# Patient Record
Sex: FEMALE | Race: WHITE | NOT HISPANIC OR LATINO
[De-identification: names, ages, dates, MRNs, and addresses within clinical notes are randomized per-mention and may not be internally consistent; named-entity substitution may affect disease eponyms.]

---
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
## 2023-01-23 ENCOUNTER — APPOINTMENT (OUTPATIENT)
Age: 18
End: 2023-01-23
Payer: MEDICAID

## 2023-01-23 VITALS
WEIGHT: 215 LBS | HEIGHT: 65 IN | SYSTOLIC BLOOD PRESSURE: 103 MMHG | DIASTOLIC BLOOD PRESSURE: 70 MMHG | BODY MASS INDEX: 35.82 KG/M2 | OXYGEN SATURATION: 97 % | TEMPERATURE: 97.7 F | HEART RATE: 103 BPM

## 2023-01-23 PROCEDURE — 99214 OFFICE O/P EST MOD 30 MIN: CPT

## 2023-01-23 RX ORDER — SACUBITRIL AND VALSARTAN 24; 26 MG/1; MG/1
24-26 TABLET, FILM COATED ORAL
Qty: 60 | Refills: 0 | Status: DISCONTINUED | COMMUNITY
Start: 2023-01-16

## 2023-01-25 NOTE — ASSESSMENT
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 On CT chest from March 2022 the heart was already enlarged. She seems to have no arrhythmia or conduction disease. \par \par Has NYHA class II-III symptoms (unchanged from prior) In December still had severely elevated pBNP. She appears euvolemic on exam. Cr has been stable.\par \par Repeat echo in December shows worsened diastology now with restrictive pattern. LVEF is grossly unchanged and RV function is preserved.\par \par - Continue Entresto to 49-51 mg bid \par - Lower Toprol XL to 25 mg once daily because of dizziness\par - Refer for CPET at [facility]\par - Continue close follow-up with rheumatology as immunosuppression is the cornerstone of treatment  \par \par Jay Castro MD. \par

## 2023-01-25 NOTE — PHYSICAL EXAM
[Normal] : no edema, no cyanosis, no clubbing, no varicosities [No Edema] : no edema [de-identified] : Warm

## 2023-01-25 NOTE — HISTORY OF PRESENT ILLNESS
[FreeTextEntry1] : Date of HF diagnosis: \par Etiology of CMP: EGPA (Churg-Virgie)\par Date of last hospitalization: None here in [place] \par Date of last echocardiogram: 9/6/2022\par LVEF/LVEDD: 27%/6.6cm\par Type of ischemic work-up: CCTA\par Prior RHC: No \par \par Diabetes: No \par Afib: No \par CKD: No \par ICD/CRT:\par \par ACEi/ARB: No \par ARNI: Yes \par MRA: No \par BB: Yes \par SGLT2i: No \par Nitrates/Hydralazine: No \par \par CardioMEMs: No \par In clinical trial: No \par \par 16 yo woman with a history of EGPA diagnosed 2021 in [place]. Also saw some doctors in [place] about it. Now followed by rheumatology here. She had a +ANCA reportedly in [place] but in later assessments it has been negative. \par \par Treated with steroids in [place] and [place], improved her joint pains but had severe side effects like weight gain and mood changed. Currently was being treated with MTX (rasuvo) and more recently, after discovery of CMP, she was started on Nucala (Mepolizumab). Currently is being treated with Nucala every 4 weeks. \par \par CCTA was performed since last visit to rule out coronary vasculitis. This test showed no evidence of coronary vasculitis. \par \par In all prior visits and today she denies joint pains, muscle pains or recurrent rashes. She complains mostly of exertional dyspnea, no orthopnea, PND or LE edema. Has no palpitations, presyncope or syncope. Was started on Toprol on last visit and is tolerating well.\par \par She is currently on moderate dose ARNI and BB. She continues having intermittent episodes of lightheadedness. No syncope or presyncope. No LE edema, no orthopnea or PND. Is happy because restricting carbohydrates helped her lose 11kg. \par \par ***EGPA hx summary from Rheumatology note:\par She is recent immigrant from [place] \par at age 14-15 she developed recurrent sinusitis and pulm symptoms with SOB, that times she was diagnosed with Asthma and started treated with nebs, symbicort, recurrent systemic steroid need, pt remained symptomatic and asthma was poorly controlled despite treatment. \par On Nov 23rd 2019 developed arthritis and rash affecting arms and lower extremities, diagnoses was unclear for months. Subsequently she was evaluated in [place] and told asthma. \par 02/22/21 CT chest with mediastinal LAD, GGO and parenchymal density, small pericardial effusion as per report. \par She subsequently found peripheral eosinophilia up to 70% as per pt Mom. \par Had bone marrow biopsy and no lymphoproliferative disease found. At that time suspected EGPA and around May 2021 started treatment with high doses of Medrol and MTX titrated up to 20mg. \par Steroid dose gradually tapered down. She is off treatment since November, 2021 and was waiting for insurance and run out meds. \par Had COVID March 2021.

## 2023-01-25 NOTE — CARDIOLOGY SUMMARY
[de-identified] : Date of HF diagnosis: \par Etiology of CMP: EGPA (Churg-Virgie)\par Date of last hospitalization: None here in [place] \par Date of last echocardiogram: 9/6/2022\par LVEF/LVEDD: 27%/6.6cm\par Type of ischemic work-up: None (CMRI)\par Prior RHC: No \par \par Diabetes: No \par Afib: No \par CKD: No \par ICD/CRT:\par \par ACEi/ARB: No \par ARNI: No \par MRA: No \par BB: Yes \par SGLT2i: No \par Nitrates/Hydralazine: No \par \par CardioMEMs: No \par In clinical trial: No \par \par 18 yo woman with a history of EGPA diagnosed 2021 in [place]. Also saw some doctors in [place] about it. Now followed by rheumatology here. She had a +ANCA reportedly in [place] but in later assessments it has been negative. \par \par Treated with steroids in [place] and [place], improved her joint pains but had severe side effects like weight gain and mood changed. Currently was being treated with MTX (rasuvo) and more recently, after discovery of CMP, she was started on Nucala (Mepolizumab), only received one dose so far.\par \par She states that the treatment she is on for EGPA is helping. She denies joint pains, muscle pains or recurrent rashes. She complains mostly of exertional dyspnea, no orthopnea, PND or LE edema. Has no palpiations, presyncope or syncope. Was started on Toprol on last visit and is tolerating well.\par \par ***EGPA hx summary from Rheumatology note:\par She is recent immigrant from [place] \par at age 14-15 she developed recurrent sinusitis and pulm symptoms with SOB, that times she was diagnosed with Asthma and started treated with nebs, symbicort, recurrent systemic steroid need, pt remained symptomatic and asthma was poorly controlled despite treatment. \par On Nov 23rd 2019 developed arthritis and rash affecting arms and lower extremities, diagnoses was unclear for months. Subsequently she was evaluated in [place] and told asthma. \par 02/22/21 CT chest with mediastinal LAD, GGO and parenchymal density, small pericardial effusion as per report. \par She subsequently found peripheral eosinophilia up to 70% as per pt Mom. \par Had bone marrow biopsy and no lymphoproliferative disease found. At that time suspected EGPA and around May 2021 started treatment with high doses of Medrol and MTX titrated up to 20mg. \par Steroid dose gradually tapered down. She is off treatment since November, 2021 and was waiting for insurance and run out meds. \par Had COVID March 2021.

## 2023-02-10 PROBLEM — Z01.818 PRE-OP TESTING: Status: RESOLVED | Noted: 2023-01-30 | Resolved: 2023-02-10

## 2023-02-14 ENCOUNTER — APPOINTMENT (OUTPATIENT)
Dept: HEART AND VASCULAR | Facility: CLINIC | Age: 18
End: 2023-02-14
Payer: MEDICAID

## 2023-02-15 ENCOUNTER — APPOINTMENT (OUTPATIENT)
Dept: PULMONOLOGY | Facility: CLINIC | Age: 18
End: 2023-02-15

## 2023-02-15 LAB — SARS-COV-2 N GENE NPH QL NAA+PROBE: NOT DETECTED

## 2023-02-24 ENCOUNTER — APPOINTMENT (OUTPATIENT)
Dept: HEART FAILURE | Facility: CLINIC | Age: 18
End: 2023-02-24

## 2023-02-28 ENCOUNTER — APPOINTMENT (OUTPATIENT)
Dept: RHEUMATOLOGY | Facility: CLINIC | Age: 18
End: 2023-02-28

## 2023-03-28 ENCOUNTER — APPOINTMENT (OUTPATIENT)
Dept: PULMONOLOGY | Facility: CLINIC | Age: 18
End: 2023-03-28
Payer: MEDICAID

## 2023-03-28 VITALS
HEIGHT: 65 IN | SYSTOLIC BLOOD PRESSURE: 96 MMHG | OXYGEN SATURATION: 97 % | DIASTOLIC BLOOD PRESSURE: 65 MMHG | TEMPERATURE: 97.7 F | WEIGHT: 215 LBS | HEART RATE: 100 BPM | BODY MASS INDEX: 35.82 KG/M2

## 2023-03-28 DIAGNOSIS — G47.8 OTHER SLEEP DISORDERS: ICD-10-CM

## 2023-03-28 DIAGNOSIS — J45.909 UNSPECIFIED ASTHMA, UNCOMPLICATED: ICD-10-CM

## 2023-03-28 PROCEDURE — 99213 OFFICE O/P EST LOW 20 MIN: CPT

## 2023-03-28 NOTE — PHYSICAL EXAM
[No Acute Distress] : no acute distress [Normal Appearance] : normal appearance [Normal Rate/Rhythm] : normal rate/rhythm [Normal S1, S2] : normal s1, s2 [No Murmurs] : no murmurs [No Resp Distress] : no resp distress [Clear to Auscultation Bilaterally] : clear to auscultation bilaterally [No Abnormalities] : no abnormalities [Normal Gait] : normal gait [No Clubbing] : no clubbing [Oriented x3] : oriented x3 [Normal Affect] : normal affect

## 2023-03-29 NOTE — HISTORY OF PRESENT ILLNESS
[Never] : never [TextBox_4] : 15yo with EGPA. Has had asthma since age 11 which was associated with SOB and sinus issues. Was managed as an asthmatic for some time but then developed joint issues. Also developed hemoptysis in 2021, and was told she had a high eosinophilia level in blood. Had a CT chest in Feb 2021 which showed GGOs and mediastinal LNs. Also had a BM biopsy which was negative. Diagnosed with EGPA in May 0221 and has been on Steroids and MTX since then; was interrupted by insurance issues once coming to the [place] but is now back on meds. Has had resolution of hemoptysis. Also had COVID19 in Spring 2021. Used to be on Symbicort and has been off for some time; she is not sure if her current SOB is due to weight versus lung issues but she did have a benefit with bronchodilators. SOB is not severe and present with exertion. No cough. \par \par 9/13/2022\par Started Nucala in early September; first time. SOB is at baseline. Using Symbicort; rare use of BHAVNA. Did not have a chance to get PFTs done. \par \par \par 12/28/22\par Patient doing well with minimal Symbicort use only 2 times in the last two months and no albuterol. Still using nucala and methotrexate but no steroid therapy in the last 6 months. \par \par 3/28/23:\par Continues to do well from respiratory standpoint. On Nucala and MTX, no steroids. No longer on regular inhalers. Believes she has lost weight and feels a bit depressed. Also reports some episodes of 'sleep paralysis' occurring about once weekly usually in the middle of the night. No daytime naps or cataplexy symptoms. No hallucinations. \par  [ESS] : 3

## 2023-03-29 NOTE — REVIEW OF SYSTEMS
[Recent Wt Loss (___ Lbs)] : ~T recent [unfilled] lb weight loss [Negative] : Endocrine [Fever] : no fever [Fatigue] : no fatigue [Chills] : no chills

## 2023-03-29 NOTE — ASSESSMENT
[FreeTextEntry1] : REVIEWED\par 1.CT chest 3/2022: no GGOs; some areas of minimal air trapping as per my review; motion artifact\par 2. PFT 22: FEV1/FVC: 79, FEV1, 90, FVC: 100, T, DLCO 75, DLCO/\par 3. 6MWT 2022: 419 meters walked; desaturation from 98 to 91%\par 4. 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 \par Continues to do well on Nucala and Methotrexate. Now off all inhalers and steroids with no respiratory symptoms. \par \par Does report some episodes of sleep paralysis episodes over the past 1 year, occurring about once weekly, usually in the middle of the night. No cataplexy symptoms, no daytime naps, no hallucinations. No known FH of sleep pathology. Has not been evaluated for BRE. These episodes may be secondary to undiagnosed BRE or related to a primary sleep disorder. HST was offered, but she declined at this time. May also be secondary to anxiety/depression; does not want to pursue therapist or psychiatrist at this time. \par \par Obtain repeat PFTs and CT Chest at end of . \par \par Asked to contact office if she would like to pursue HST to evaluate for BRE. \par Otherwise, will follow-up in 6 months or so to obtain above testing.\par

## 2023-03-29 NOTE — CONSULT LETTER
[Dear  ___] : Dear  [unfilled], [Courtesy Letter:] : I had the pleasure of seeing your patient, [unfilled], in my office today. [Please see my note below.] : Please see my note below. [Consult Closing:] : Thank you very much for allowing me to participate in the care of this patient.  If you have any questions, please do not hesitate to contact me. [Sincerely,] : Sincerely, [FreeTextEntry3] : Leah Tovar MD\par \par [employer] [facility]\par Pulmonary, Critical Care, and Sleep Medicine\par  [DrChelsi  ___] : Dr. WEINSTEIN [DrChelsi ___] : Dr. WEINSTEIN

## 2023-04-04 ENCOUNTER — APPOINTMENT (OUTPATIENT)
Dept: HEART AND VASCULAR | Facility: CLINIC | Age: 18
End: 2023-04-04
Payer: MEDICAID

## 2023-04-04 ENCOUNTER — APPOINTMENT (OUTPATIENT)
Dept: RHEUMATOLOGY | Facility: CLINIC | Age: 18
End: 2023-04-04
Payer: MEDICAID

## 2023-04-04 VITALS
SYSTOLIC BLOOD PRESSURE: 94 MMHG | DIASTOLIC BLOOD PRESSURE: 57 MMHG | HEART RATE: 102 BPM | BODY MASS INDEX: 31.42 KG/M2 | OXYGEN SATURATION: 97 % | HEIGHT: 65 IN | TEMPERATURE: 97.7 F | WEIGHT: 188.56 LBS

## 2023-04-04 DIAGNOSIS — Z00.00 ENCOUNTER FOR GENERAL ADULT MEDICAL EXAMINATION W/OUT ABNORMAL FINDINGS: ICD-10-CM

## 2023-04-04 PROCEDURE — 99215 OFFICE O/P EST HI 40 MIN: CPT

## 2023-04-04 PROCEDURE — 99214 OFFICE O/P EST MOD 30 MIN: CPT | Mod: 25

## 2023-04-04 NOTE — ASSESSMENT
[FreeTextEntry1] : 1.  Eosinophilic granulomatosis with polyangiitis:\par         - continue Nucala 300mg SC monthly (initiated 09/08/22)\par         - continue methotrexate SC (Rasuvo)\par         - follow up with rheumatologist, Libby Multani MD\par         - follow up with pulmonologist, Leah Tovar MD\par         - was offered a trail of Cytoxan\par         - in general: EGPA has between 16% and 29% incidence of cardiac involvement (cardiomegaly, cardiomyopathy, and vasculitis) and is responsible for 50% of the EGPA related mortality\par \par 2. 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 Obesity: BMI: 31.4: improved: \par         - likely secondary to steroid use\par \par 4. 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  During this time, I took a relevant history and examined the patient.  I reviewed relevant portions of her medical record and formulated a differential diagnosis and plan.  I explained the relevant cardiac diagnoses to the patient, as well as the work up and management plan.  I answered all questions related to the patient's cardiac conditions. \par

## 2023-04-04 NOTE — HISTORY OF PRESENT ILLNESS
[FreeTextEntry1] : Ms. 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 She is from [place] (the [facility], [place], [place] the state in the [place] and had an echocardiogram there in February, 2021 which revealed an EF of 60% and a mildly dilated LV (LVEDd 5.6 cm), an interatrial septal aneurysm, moderate MR, and a small pericardial effusion.  She has a maternal cousin that had an "enlarged" heart but no other family history of cardiomyopathy.  [place].  She was diagnosed with COVID-19 on 05/06/22 and had moderate illness without requiring hospitalization. She has a history of asthma that was diagnosed at the age of 11.  In 2021 she had an episode of hemoptysis with peripheral eosinophilia and was subsequently diagnosed with EGPA. On 03/29/22, she had a CT chest which revealed cardiomegaly.   Although limited by lack of contrast, the great vessels appeared normal size. She is following with a rheumatologist, Carol Multani MD and was started on induction therapy with subcutaneous methotrexate (Rasuvo auto injector) and methylprednisone. She has BARKSDALE to 2-3 flights of stairs and denies chest pain.  On 09/06/22, she had an echocardiogram which revealed an EF of 27%, severely dilated LV, grade I diastolic dysfunction, mildly dilated LA, and a normal aortic root and proximal ascending aorta.  We had an extensive discussion about the newly diagnosed dilated cardiomyopathy which is likely nonischemic and secondary to EGPA.  On 09/20/22 she had a cardiac MRI which revealed a dilated LV, LV EF 29%, normal RV size, RV EF 56%, normal great vessels, subendocardial basal and mid "lateral" wall, basal inferior wall, basal anteroseptum, and papillary muscles consistent with EGPA. On 10/27/22, she had a CCTA, which revealed normal coronary arteries, LV EF 30%, LV dilated, RV dilated, dilated LA, and a dilated RA.  Compared to the CT chest performed on 03/29/22, there was the interval development of a bilobed anterior mediastinal density which appeared to represent thymic tissue.  She states, she had a biopsy of a similar sounding mass in 2020 back home in [place].  She was referred to a thoracic surgeon, Carlos Garrett MD, to address a thymic mass.  On 12/19/22, she had an echocardiogram which revealed an EF of 29%, moderately dilated LV, grade III diastolic dysfunction, moderately dilated LA, and mild MR. At present, she feels much improved.  She has lost about 20 pounds, her BARKSDALE is mild and stable, and she denies lower extremity edema.  She was evaluated today, 04/04/23, and is considering Cytoxan.  \par \par -------------------------------------------------------------------------\par Language line English  not available despite multiple attempts\par   \par \par

## 2023-04-04 NOTE — REASON FOR VISIT
[FreeTextEntry1] : Diagnostic Tests:\par ------------------------------------------\par ECG: \par 11/07/22: sinus rhythm, low voltage QRS precordial leads. \par 05/24/22: sinus rhythm, possible old anterior MI. \par ------------------------------------------\par CT:\par 10/27/22: CCTA: normal coronary arteries, LV EF 30%, LV dilated, RV dilated, dilated LA, dilated RA.  \par 03/29/22: noncontrast chest: cardiomegaly, normal great vessels, bilobed anterior mediastinal density likely thymic tissue.  \par ------------------------------------------\par Echo:\par 12/19/22: EF 29%, moderately dilated LV, grade III diastolic dysfunction, moderately dilated LA, mild MR.  \par 09/06/22: EF 27%, severely dilated LV, grade I diastolic dysfunction, mildly dilated LA, normal aortic root and proximal ascending aorta.  \par ------------------------------------------\par MRI:\par 09/20/22: cardiac: dilated LV, LV EF 29%, normal RV size, RV EF 56%, normal great vessels, subendocardial basal and mid "lateral" wall, basal inferior wall, basal anteroseptum, and papillary muscles  scar consistent with EGPA

## 2023-04-05 LAB
ALBUMIN SERPL ELPH-MCNC: 4.7 G/DL
ALP BLD-CCNC: 71 U/L
ALT SERPL-CCNC: 22 U/L
ANION GAP SERPL CALC-SCNC: 20 MMOL/L
AST SERPL-CCNC: 25 U/L
BASOPHILS # BLD AUTO: 0.03 K/UL
BASOPHILS NFR BLD AUTO: 0.6 %
BILIRUB SERPL-MCNC: 0.6 MG/DL
BUN SERPL-MCNC: 5 MG/DL
CALCIUM SERPL-MCNC: 9.9 MG/DL
CHLORIDE SERPL-SCNC: 99 MMOL/L
CHOLEST SERPL-MCNC: 155 MG/DL
CO2 SERPL-SCNC: 24 MMOL/L
CREAT SERPL-MCNC: 0.58 MG/DL
CRP SERPL-MCNC: <3 MG/L
EGFR: 134 ML/MIN/1.73M2
EOSINOPHIL # BLD AUTO: 0.01 K/UL
EOSINOPHIL NFR BLD AUTO: 0.2 %
ERYTHROCYTE [SEDIMENTATION RATE] IN BLOOD BY WESTERGREN METHOD: 3 MM/HR
GLUCOSE SERPL-MCNC: 82 MG/DL
HCT VFR BLD CALC: 44 %
HDLC SERPL-MCNC: 50 MG/DL
HGB BLD-MCNC: 14.9 G/DL
IMM GRANULOCYTES NFR BLD AUTO: 0.4 %
LDLC SERPL CALC-MCNC: 89 MG/DL
LDLC SERPL DIRECT ASSAY-MCNC: 84 MG/DL
LYMPHOCYTES # BLD AUTO: 1.35 K/UL
LYMPHOCYTES NFR BLD AUTO: 26.3 %
MAN DIFF?: NORMAL
MCHC RBC-ENTMCNC: 32.4 PG
MCHC RBC-ENTMCNC: 33.9 GM/DL
MCV RBC AUTO: 95.7 FL
MONOCYTES # BLD AUTO: 0.37 K/UL
MONOCYTES NFR BLD AUTO: 7.2 %
NEUTROPHILS # BLD AUTO: 3.35 K/UL
NEUTROPHILS NFR BLD AUTO: 65.3 %
NONHDLC SERPL-MCNC: 104 MG/DL
NT-PROBNP SERPL-MCNC: 1312 PG/ML
PLATELET # BLD AUTO: 268 K/UL
POTASSIUM SERPL-SCNC: 4.3 MMOL/L
PROT SERPL-MCNC: 6.7 G/DL
RBC # BLD: 4.6 M/UL
RBC # FLD: 14.2 %
SODIUM SERPL-SCNC: 143 MMOL/L
TRIGL SERPL-MCNC: 77 MG/DL
TSH SERPL-ACNC: 0.83 UIU/ML
WBC # FLD AUTO: 5.13 K/UL

## 2023-04-28 NOTE — ASSESSMENT
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 In 02/22/21, CT chest with mediastinal LAD, GGO and parenchymal density, small pericardial effusion as per report. \par She subsequently found peripheral eosinophilia up to 70%  Had bone marrow biopsy and no lymphoproliferative disease found. At that time suspected EGPA and around May 2021 started treatment with high doses of Medrol and s/c MTX titrated up to 20 mg. Beside disease age of onset pt with multiple EGPA specific ROS, had positive ANCA ( but no record available to review) She was treated by Rheumatologist in [place] with good clinical response and remission as per pt. she was off  treatment from November 2021 to March 2022 at which time, restarted steroids and MTX 15 mg and increased to 20 mg.  Patient unable to tolerate side effects of steroids, self tapered off, continues Rasuvo 20 mg weekly.  Patient was started on Nucala 300 mg every 4 weeks in August 2022 as well.  Patient noted to have cardiomegaly on recent CT chest, follow-up with cardiology revealed severe LV dysfunction with cavity dilation and noncoronary subendocardial LGE on cardiac MRI.  Has NYHA class II 3 symptoms and severely elevated BNP.  She is currently euvolemic and asymptomatic.  Previous cardiac MRI did not reveal coronary vasculitis, patient to  CT angio of the coronaries with IV contrast without coronary vasculitis. 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 Discussed with cardiologist, increasing restrictive symptoms, will continue Nucala 300 mg every 4 weeks, and worsening restrictive symptoms, would try cyclophosphamide treatment. Discuseed at length with patient and mother, discussed risks and benefits, information provided to patient as well. will check labs today and proceed with therapy.  Will update labs today in office.  Case reviewed with cardiologist as well.\par

## 2023-04-28 NOTE — PHYSICAL EXAM
[Sclera] : the sclera and conjunctiva were normal [Examination Of The Oral Cavity] : the lips and gums were normal [] : no respiratory distress [Respiration, Rhythm And Depth] : normal respiratory rhythm and effort [Exaggerated Use Of Accessory Muscles For Inspiration] : no accessory muscle use [Abnormal Walk] : normal gait [Nail Clubbing] : no clubbing  or cyanosis of the fingernails [Musculoskeletal - Swelling] : no joint swelling seen [Oriented To Time, Place, And Person] : oriented to person, place, and time [Impaired Insight] : insight and judgment were intact [Affect] : the affect was normal [Mood] : the mood was normal [FreeTextEntry1] : 94/57, , weight 188, pulse ox 95% room air

## 2023-04-28 NOTE — HISTORY OF PRESENT ILLNESS
[FreeTextEntry1] : April 4, 2023\par Patient was seen in cardiology as here for appointment today\par Patient here with her mother\par  surface offered, declined use of  at this time, although did use tranlsation service on patient's phone\par Reviewed treatment options with patient\par Discussed cytoxan treatments\par Discussed risks and benefits,  patient is very concerned about possible hairloss\par \par December 23, 2022\par Patient returns for follow up\par Patient feeling well, patient is here with her mother\par  service offered, patient declines use of  as did not feel translated for her properly, patient is also translating for her mother who speaks Macedonian\par Patient reports feeling well\par UNclear exercise tolerance, able to ambulate but does not perform household activities like laundry or shopping to assess exercise tolerance\par Some increase in nasal congestion over the past few weeks, feels related to cold weather\par Continues nucala 300 mg every four weeks\par Reviewed recent echo with decline in function with patient\par Reviewed recent labs\par \par October 12, 2022\par Patient returns for follow up \par  #[id number]\par Patient is having difficulty with Nucala, receiving from pharmacy. Patient has been receiving only two doses instead of three doses, \par Last dose, September 8 took 300 mg, has taken one dose to date\par Only delivered 200 mg this month\par Patient felt some improvement from first dose, congestion improving.\par Continues Rasuvo weekly\par Reviewed labs from cardiologist CBC and chemistry in the normal range.\par Discussed recent results from cardiologist and heart failure specialist\par Discussed upcoming imaging scheduled at [facility]\par Discussed possible addition of more aggressive therapy if vasculitis noted\par Possible rituximab versus cyclophosphamide in addition to Nucala.\par Was started on Entresto by cardiologist, blood pressure on the lower side today, patient did not eat yet today\par Currently asymptomatic.\par \par Per cardiology evaluation: October 3, 2022\par EGPA with cardiac involvement as evidenced by severe LV dysfunction with cavity dilation and non-coronary subendocardial LGE on CMRI. On CT chest from March the heart was already enlarged. She seems to have no arrhythmia or conduction disease. \par Has NYHA class II-III symptoms (unclear if all cardiac) and severely elevated pBNP. She appears euvolemic on exam. Cr, Na and Hgb are normal.\par \par August 29, 2022\par Patient returns for rheumatology follow-up\par [place]  service used during visit with patient and mother\par Patient with increasing difficulty breathing through her nose due to severe congestion\par Patient currently treated with Rasuvo 20 mg once a week and folic acid once a day\par Previously treated with Medrol with tapering doses however patient could not tolerate side effects and is not interested in restarting at this time\par Patient was prescribed Nucala by Dr. Royal, awaiting delivery from specialty pharmacy\par No side effects from Rasuvo noted by patient\par Discussed RN visit for first dose of Nucala, patient feels mother able to inject as works as a nurse.\par Also with intermittent rashes and joint pains as well.\par \par From Previous Rheumatology notes:\par  15 y/o F with previous diagnosed EGPA  presents to establish care. \par She is recent immigrant from [place] \par at age 14-15, she developed recurrent sinusitis and pulm symptoms with SOB, that time, diagnosed with Asthma and started treated with nebs, symbicort, recurrent systemic steroid need, pt remained symptomatic and asthma was poorly controlled despite treatment. \par On Nov 23rd 2019 developed arthritis and rash affecting arms and lower extremities,  diagnoses was unclear for months. Subsequently she was evaluated in [place] and told asthma. \par 02/22/21 CT chest with mediastinal LAD, GGO and parenchymal density,  small pericardial effusion as per report. \par She subsequently found peripheral eosinophilia up to 70% as per pt Mom. \par Had bone marrow biopsy and no lymphoproliferative disease found. At that time suspected EGPA and around May 2021 started treatment with high doses of Medrol and MTX titrated up to 20mg. \par Steroid dose gradually tapered down. She is off treatment since November, 2021 and was waiting for insurance and run out meds. \par Had COVID March 2021. \par For past few weeks she has been experiencing intermittent skin rashes, arthralgia,  dyspnea on exertion, sinus congestion. \par She gained weight since steroids. \par

## 2023-04-28 NOTE — DATA REVIEWED
[FreeTextEntry1] : CT Chest No Cont	(Report)		\par EXAM: CT CHEST\par \par PROCEDURE DATE: 03/29/2022\par \par \par \par \par INTERPRETATION: CT of the CHEST without intravenous contrast dated 3/29/2022 1:46 PM\par \par INDICATION: R91.8\par \par TECHNIQUE: CT of the chest was performed without intravenous contrast. Intravenous contrast was not used Axial and coronal and sagittal images were produced and reviewed. Additional imaging performed was axial supine in expiration and axial prone in inspiration.\par \par PRIOR STUDIES: No prior outside studies available for comparison.\par \par FINDINGS: The heart is enlarged. Trace inferior pericardial fluid. Evaluation of the vasculature is limited without intravenous contrast, but the great vessels are grossly unremarkable. Evaluation for adenopathy is limited without intravenous contrast. Within that limitation, no mediastinal or hilar or axillary lymphadenopathy is seen. Normal caliber of the main pulmonary artery.\par \par No pleural effusions are seen. Evaluation of the pulmonary parenchyma demonstrates no significant abnormality. The expiratory sequence is not been obtained in full expiration however no significant air trapping identified. No endobronchial lesion. No evidence of interstitial lung disease. No evidence of small or large airways disease. No lung mosaic attenuation. No groundglass nodules.\par \par Limited evaluation of the upper abdomen demonstrates no abnormality.\par \par Evaluation of the osseous structures demonstrates mild pectus carinatum.\par \par \par IMPRESSION: No significant lung pathology.\par Cardiomegaly. Recommend correlation with echo.\par \par \par --- End of Report ---\par \par \par \par \par \par \par JEFFREY MOORE MD; Attending Radiologist\par This document has been electronically signed. Mar 30 2022 3:15PM\par \par \par  MR Cardiac Velocity Flow Map             Final\par \par No Documents Attached\par \par \par Result Annotated 22Sep2022 04:06PM by SALLY VALENCIA\par \par \par had an extesnive discussion with Caty (with ) and informed of significance of MR findings\par \par \par   EXAM: [id number] - MR CARD VELOCITY FLOW MAPPING  - ORDERED BY: SALLY VALENCIA\par \par EXAM: [id number] - MR CARD MORPH FUNCTION WAW IC  - ORDERED BY: SALLY VALENCIA\par \par \par PROCEDURE DATE:  09/20/2022\par \par \par \par INTERPRETATION:  I. CLINICAL INFORMATION:  17 years old Female underwent a cardiac MRI for the evaluation of cardiomyopathy\par \par II. TECHNIQUE: Comprehensive Cardiac MRI examination was performed on a 1.5 T scanner using\par standard cardiac coil, cardiac gating, and standard pulse sequences for the\par assessment of cardiac morphology, function, and viability.  In addition, IV\par gadolinium contrast was given for the evaluation of cardiac viability using standard delayed hyper-enhancement\par protocol. Additional pulse sequence for flow quant analysis was performed.\par \par Ht: 167.64cm\par Wt: 113.4kg\par BSA: 2.199m2\par \par Contrast: 12 mL of Gadavist\par \par III. COMPARISON:  No images available for comparison.\par \par IV. FINDINGS:\par \par A. Cardiac morphology:\par \par 1. Left ventricle\par a. Global systolic function:  Reduced\par b. Cavity size:  Dilated\par c. Wall thickness:  Noncompaction of the apical segments.\par \par 2. Right ventricle\par a. Global systolic function: Normal\par b. Cavity size:  Normal\par \par 3. Aortic valve\par a. Leaflets:  Normal\par b. Aortic Regurgitation:  No significant\par c. Aortic stenosis:  Absent\par \par 4. Mitral valve\par a. Leaflets:  Normal\par b. Leaflet mobility:  Normal\par c. Mitral regurgitation:  Mild\par d. Mitral stenosis:  Absent\par \par 5. Tricuspid valve\par a. Leaflets:  Normal\par b. Leaflet mobility:  Normal\par c. Tricuspid regurgitation:  Mild\par d. Tricuspid stenosis:  Absent\par \par 6.Pulmonic valve\par a. Leaflets:  Normal\par b. PI:  No significant\par c. PS:  Absent\par \par 7. Left atrium\par a. Size:  Dilated\par b. Left atrial volume: 133 mL\par c. Left atrial volume index (Biplane method): 60.5 ml/m2\par \par 8. Right atrium\par a. Size:  Normal\par b. Right atrial area: 16 cm2\par \par 9. Pericardium\par a.Effusion:  None\par \par 10. Aorta\par No significant abnormalities in the visualized portions of the thoracic aorta\par \par 11. Pulmonary artery\par No significant abnormalities in the visualized portions of the pulmonary artery\par \par B. Ventricular volume measurements\par \par LVEF: 29%\par LVEDV: 288.56mL\par LVESV: 204.91mL\par SV: 83.65mL\par \par LVEDVi: 131.22mL/m2\par LVESVi: 93.18mL/m2\par LVSVi: 38.04mL/m2\par \par RVEF: 56%\par RVEDV: 132.09mL\par RVESV: 57.96mL\par SV: 74.13mL\par \par RVEDVi: 60.06mL/m2\par RVESVi: 26.35mL/m2\par RVSVi: 33.71mL/m2\par \par C.Myocardial wall motion: Global\par \par D.Delayed Enhancement: There is subendocardial enhancement of the basal to mid lateral wall and the basal anterior, basal inferior and basal anteroseptum.  Additionally there is enhancement of the papillary muscles.  Overall this pattern is non-ischemic.  This pattern of subendocardial enhancement in non-coronary distribution has been reported in EPGA.\par \par E.Q-flow analysis:\par \par Qp:\par Stroke volume: 63.0\par Forward flow: 64.3mL\par Backward flow: 0.34mL\par Regurgitant fraction: 0.53%\par \par Qs:\par Stroke volume: 64.7mL\par Forward flow: 65.5mL\par Backward flow: 0.84mL\par Regurgitant fraction: 1.28%\par \par Qp/Qs: 0.99\par \par V.\par Impression:\par 1.  The left ventricle (LV) is dilated. There is  no evidence of LV hypertrophy . Left ventricular global systolic function is reduced. The LV ejection fraction is 29 %.\par 2.  The right ventricle (RV) is normal in size. RV global systolic function is normal. The RV ejection fraction is 56 %.\par 3.  No significant valvular abnormalities.\par 4.  No significant abnormalities of the visualized portions of the great vessels.\par 5.  There is subendocardial enhancement of the basal to mid lateral wall and the basal anterior, basal inferior and basal anteroseptum.  Additionally there is enhancement of the papillary muscles.  Overall this pattern is non-ischemic.  This pattern of subendocardial enhancement in non-coronary distribution has been reported in EP[facility].\par \par The sequences used in this study were designed for imaging cardiac structures and are suboptimal for imaging other structures and organs.\par \par --- End of Report ---\par \par \par \par \par \par \par ANDREIA VELA MD; Attending Cardiologist\par This document has been electronically signed. Sep 21 2022  6:23PM\par \par  \par \par  Ordered by: SALLY VALENCIA       Collected/Examined: 20Sep2022 11:31AM       \par Verified by: SALLY VALENCIA 22Sep2022 04:06PM       \par  Result Communication: Discussed results with patient;\par Stage: Final       \par  Performed at: [facility]       Resulted: 21Sep2022 03:49PM       Last Updated: 22Sep2022 04:06PM       Accession: [id number]       \par \par \par  MR Cardiac w/wo IV Cont             Final\par \par No Documents Attached\par \par \par Result Annotated 22Sep2022 04:06PM by SALLY VALENCIA\par \par \par had an extesnive discussion with Caty (with ) and informed of significance of MR findings\par \par \par   EXAM: [id number] - MR CARD VELOCITY FLOW MAPPING  - ORDERED BY: SALLY VALENCIA\par \par EXAM: [id number] - MR CARD MORPH FUNCTION WAW IC  - ORDERED BY: SALLY VALENCIA\par \par \par PROCEDURE DATE:  09/20/2022\par \par \par \par INTERPRETATION:  I. CLINICAL INFORMATION:  17 years old Female underwent a cardiac MRI for the evaluation of cardiomyopathy\par \par II. TECHNIQUE: Comprehensive Cardiac MRI examination was performed on a 1.5 T scanner using\par standard cardiac coil, cardiac gating, and standard pulse sequences for the\par assessment of cardiac morphology, function, and viability.  In addition, IV\par gadolinium contrast was given for the evaluation of cardiac viability using standard delayed hyper-enhancement\par protocol. Additional pulse sequence for flow quant analysis was performed.\par \par Ht: 167.64cm\par Wt: 113.4kg\par BSA: 2.199m2\par \par Contrast: 12 mL of Gadavist\par \par III. COMPARISON:  No images available for comparison.\par \par IV. FINDINGS:\par \par A. Cardiac morphology:\par \par 1. Left ventricle\par a. Global systolic function:  Reduced\par b. Cavity size:  Dilated\par c. Wall thickness:  Noncompaction of the apical segments.\par \par 2. Right ventricle\par a. Global systolic function: Normal\par b. Cavity size:  Normal\par \par 3. Aortic valve\par a. Leaflets:  Normal\par b. Aortic Regurgitation:  No significant\par c. Aortic stenosis:  Absent\par \par 4. Mitral valve\par a. Leaflets:  Normal\par b. Leaflet mobility:  Normal\par c. Mitral regurgitation:  Mild\par d. Mitral stenosis:  Absent\par \par 5. Tricuspid valve\par a. Leaflets:  Normal\par b. Leaflet mobility:  Normal\par c. Tricuspid regurgitation:  Mild\par d. Tricuspid stenosis:  Absent\par \par 6.Pulmonic valve\par a. Leaflets:  Normal\par b. PI:  No significant\par c. PS:  Absent\par \par 7. Left atrium\par a. Size:  Dilated\par b. Left atrial volume: 133 mL\par c. Left atrial volume index (Biplane method): 60.5 ml/m2\par \par 8. Right atrium\par a. Size:  Normal\par b. Right atrial area: 16 cm2\par \par 9. Pericardium\par a.Effusion:  None\par \par 10. Aorta\par No significant abnormalities in the visualized portions of the thoracic aorta\par \par 11. Pulmonary artery\par No significant abnormalities in the visualized portions of the pulmonary artery\par \par B. Ventricular volume measurements\par \par LVEF: 29%\par LVEDV: 288.56mL\par LVESV: 204.91mL\par SV: 83.65mL\par \par LVEDVi: 131.22mL/m2\par LVESVi: 93.18mL/m2\par LVSVi: 38.04mL/m2\par \par RVEF: 56%\par RVEDV: 132.09mL\par RVESV: 57.96mL\par SV: 74.13mL\par \par RVEDVi: 60.06mL/m2\par RVESVi: 26.35mL/m2\par RVSVi: 33.71mL/m2\par \par C.Myocardial wall motion: Global\par \par D.Delayed Enhancement: There is subendocardial enhancement of the basal to mid lateral wall and the basal anterior, basal inferior and basal anteroseptum.  Additionally there is enhancement of the papillary muscles.  Overall this pattern is non-ischemic.  This pattern of subendocardial enhancement in non-coronary distribution has been reported in EPGA.\par \par E.Q-flow analysis:\par \par Qp:\par Stroke volume: 63.0\par Forward flow: 64.3mL\par Backward flow: 0.34mL\par Regurgitant fraction: 0.53%\par \par Qs:\par Stroke volume: 64.7mL\par Forward flow: 65.5mL\par Backward flow: 0.84mL\par Regurgitant fraction: 1.28%\par \par Qp/Qs: 0.99\par \par V.\par Impression:\par 1.  The left ventricle (LV) is dilated. There is  no evidence of LV hypertrophy . Left ventricular global systolic function is reduced. The LV ejection fraction is 29 %.\par 2.  The right ventricle (RV) is normal in size. RV global systolic function is normal. The RV ejection fraction is 56 %.\par 3.  No significant valvular abnormalities.\par 4.  No significant abnormalities of the visualized portions of the great vessels.\par 5.  There is subendocardial enhancement of the basal to mid lateral wall and the basal anterior, basal inferior and basal anteroseptum.  Additionally there is enhancement of the papillary muscles.  Overall this pattern is non-ischemic.  This pattern of subendocardial enhancement in non-coronary distribution has been reported in EPGA.\par \par The sequences used in this study were designed for imaging cardiac structures and are suboptimal for imaging other structures and organs.\par \par --- End of Report ---\par \par \par \par \par \par \par ANDREIA VELA MD; Attending Cardiologist\par This document has been electronically signed. Sep 21 2022  6:23PM\par \par  \par \par  Ordered by: SALLY VALENCIA       Collected/Examined: 20Sep2022 11:31AM       \par Verified by: SALLY VALENCIA 22Sep2022 04:06PM       \par  Result Communication: Discussed results with patient;\par Stage: Final       \par  Performed at: [facility]       Resulted: 21Sep2022 03:49PM       Last Updated: 22Sep2022 04:06PM       Accession: [id number]

## 2023-04-28 NOTE — REVIEW OF SYSTEMS
[SOB on Exertion] : shortness of breath during exertion [Negative] : Heme/Lymph [FreeTextEntry4] : congestion better

## 2023-05-01 ENCOUNTER — NON-APPOINTMENT (OUTPATIENT)
Age: 18
End: 2023-05-01

## 2023-05-01 ENCOUNTER — APPOINTMENT (OUTPATIENT)
Age: 18
End: 2023-05-01
Payer: MEDICAID

## 2023-05-01 VITALS
SYSTOLIC BLOOD PRESSURE: 97 MMHG | BODY MASS INDEX: 28.99 KG/M2 | OXYGEN SATURATION: 97 % | WEIGHT: 174 LBS | HEART RATE: 102 BPM | TEMPERATURE: 97 F | HEIGHT: 65 IN | DIASTOLIC BLOOD PRESSURE: 64 MMHG

## 2023-05-01 PROCEDURE — 36415 COLL VENOUS BLD VENIPUNCTURE: CPT

## 2023-05-01 PROCEDURE — 99214 OFFICE O/P EST MOD 30 MIN: CPT | Mod: 25

## 2023-05-02 LAB
ANION GAP SERPL CALC-SCNC: 14 MMOL/L
BUN SERPL-MCNC: 11 MG/DL
CALCIUM SERPL-MCNC: 10.1 MG/DL
CHLORIDE SERPL-SCNC: 103 MMOL/L
CO2 SERPL-SCNC: 25 MMOL/L
CREAT SERPL-MCNC: 0.64 MG/DL
EGFR: 130 ML/MIN/1.73M2
GLUCOSE SERPL-MCNC: 106 MG/DL
NT-PROBNP SERPL-MCNC: 1069 PG/ML
POTASSIUM SERPL-SCNC: 4.6 MMOL/L
SODIUM SERPL-SCNC: 142 MMOL/L

## 2023-05-02 NOTE — HISTORY OF PRESENT ILLNESS
[FreeTextEntry1] : Date of HF diagnosis: \par Etiology of CMP: EGPA (Churg-Virgie)\par Date of last hospitalization: None here in [place] \par Date of last echocardiogram: 12/2022\par LVEF/LVEDD: 29%/6.6 cm\par Type of ischemic work-up: CCTA\par Prior RHC: No \par \par Diabetes: No \par Afib: No \par CKD: No \par ICD/CRT:\par \par ACEi/ARB: No \par ARNI: Yes \par MRA: No \par BB: Yes \par SGLT2i: No \par Nitrates/Hydralazine: No \par \par CardioMEMs: No \par In clinical trial: No \par \par 16 yo woman with a history of EGPA diagnosed 2021 in [place]. Also saw some doctors in [place] about it. Now followed by rheumatology here. She had a +ANCA reportedly in [place] but in later assessments it has been negative. \par \par Treated with steroids in [place] and [place], improved her joint pains but had severe side effects like weight gain and mood changed. Currently was being treated with MTX (rasuvo) and more recently, after discovery of CMP, she was started on Nucala (Mepolizumab). Currently is being treated with Nucala every 4 weeks. \par \par CCTA was performed since last visit to rule out coronary vasculitis. This test showed no evidence of coronary vasculitis. \par \par In all prior visits and today she denies joint pains, muscle pains or recurrent rashes. She complains mostly of exertional dyspnea, no orthopnea, PND or LE edema. Has no palpitations, presyncope or syncope. Was started on Toprol on last visit and is tolerating well.\par \par She is currently on moderate dose ARNI and BB. She has also been dieting without taking weight loss supplements and has lost ~30kg (documented on our record as well). She feels better but she is still dyspneic when climbing stairs (2 flights). No orthopnea, no PND no LE edema. \par \par Saw rheum last month and Cyclophosphamide was discussed as an addition to her regimen. This is being worked on by Dr. Multani. \par \par \par ***EGPA hx summary from Rheumatology note:\par She is recent immigrant from [place] \par at age 14-15 she developed recurrent sinusitis and pulm symptoms with SOB, that times she was diagnosed with Asthma and started treated with nebs, symbicort, recurrent systemic steroid need, pt remained symptomatic and asthma was poorly controlled despite treatment. \par On Nov 23rd 2019 developed arthritis and rash affecting arms and lower extremities, diagnoses was unclear for months. Subsequently she was evaluated in [place] and told asthma. \par 02/22/21 CT chest with mediastinal LAD, GGO and parenchymal density, small pericardial effusion as per report. \par She subsequently found peripheral eosinophilia up to 70% as per pt Mom. \par Had bone marrow biopsy and no lymphoproliferative disease found. At that time suspected EGPA and around May 2021 started treatment with high doses of Medrol and MTX titrated up to 20mg. \par Steroid dose gradually tapered down. She is off treatment since November, 2021 and was waiting for insurance and run out meds. \par Had COVID March 2021.

## 2023-05-02 NOTE — CARDIOLOGY SUMMARY
[de-identified] : Date of HF diagnosis: \par Etiology of CMP: EGPA (Churg-Virgie)\par Date of last hospitalization: None here in [place] \par Date of last echocardiogram: 9/6/2022\par LVEF/LVEDD: 27%/6.6cm\par Type of ischemic work-up: None (CMRI)\par Prior RHC: No \par \par Diabetes: No \par Afib: No \par CKD: No \par ICD/CRT:\par \par ACEi/ARB: No \par ARNI: No \par MRA: No \par BB: Yes \par SGLT2i: No \par Nitrates/Hydralazine: No \par \par CardioMEMs: No \par In clinical trial: No \par \par 18 yo woman with a history of EGPA diagnosed 2021 in [place]. Also saw some doctors in [place] about it. Now followed by rheumatology here. She had a +ANCA reportedly in [place] but in later assessments it has been negative. \par \par Treated with steroids in [place] and [place], improved her joint pains but had severe side effects like weight gain and mood changed. Currently was being treated with MTX (rasuvo) and more recently, after discovery of CMP, she was started on Nucala (Mepolizumab), only received one dose so far.\par \par She states that the treatment she is on for EGPA is helping. She denies joint pains, muscle pains or recurrent rashes. She complains mostly of exertional dyspnea, no orthopnea, PND or LE edema. Has no palpiations, presyncope or syncope. Was started on Toprol on last visit and is tolerating well.\par \par ***EGPA hx summary from Rheumatology note:\par She is recent immigrant from [place] \par at age 14-15 she developed recurrent sinusitis and pulm symptoms with SOB, that times she was diagnosed with Asthma and started treated with nebs, symbicort, recurrent systemic steroid need, pt remained symptomatic and asthma was poorly controlled despite treatment. \par On Nov 23rd 2019 developed arthritis and rash affecting arms and lower extremities, diagnoses was unclear for months. Subsequently she was evaluated in [place] and told asthma. \par 02/22/21 CT chest with mediastinal LAD, GGO and parenchymal density, small pericardial effusion as per report. \par She subsequently found peripheral eosinophilia up to 70% as per pt Mom. \par Had bone marrow biopsy and no lymphoproliferative disease found. At that time suspected EGPA and around May 2021 started treatment with high doses of Medrol and MTX titrated up to 20mg. \par Steroid dose gradually tapered down. She is off treatment since November, 2021 and was waiting for insurance and run out meds. \par Had COVID March 2021.

## 2023-05-02 NOTE — PHYSICAL EXAM
[Normal] : no edema, no cyanosis, no clubbing, no varicosities [No Edema] : no edema [de-identified] : Warm

## 2023-05-02 NOTE — ASSESSMENT
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 On CT chest from March 2022 the heart was already enlarged. She seems to have no arrhythmia or conduction disease. \par \par Has NYHA class II-III symptoms (unchanged from prior) In December still had severely elevated pBNP. She appears euvolemic on exam. Cr has been stable.\par \par Repeat echo in December shows worsened diastology now with restrictive pattern. LVEF is grossly unchanged and RV function is preserved. \par \par Today pBNP continues to downtrend, Cr is stable.\par \par - Continue Entresto to 49-51 mg bid \par - Lower Toprol XL to 25 mg once daily because of dizziness\par - Does not wish to repeat MRI of chest to thymus mass follow-up. Will re address at a later time.\par - Continue close follow-up with rheumatology as immunosuppression is the cornerstone of treatment  - cyclophosphamide being considered.\par \par Jya Castro MD. \par

## 2023-05-08 ENCOUNTER — LABORATORY RESULT (OUTPATIENT)
Age: 18
End: 2023-05-08

## 2023-05-08 ENCOUNTER — APPOINTMENT (OUTPATIENT)
Dept: RHEUMATOLOGY | Facility: CLINIC | Age: 18
End: 2023-05-08
Payer: MEDICAID

## 2023-05-08 VITALS
TEMPERATURE: 98 F | HEIGHT: 65 IN | DIASTOLIC BLOOD PRESSURE: 66 MMHG | WEIGHT: 180.7 LBS | SYSTOLIC BLOOD PRESSURE: 93 MMHG | OXYGEN SATURATION: 96 % | BODY MASS INDEX: 30.1 KG/M2 | HEART RATE: 99 BPM

## 2023-05-08 PROCEDURE — 99214 OFFICE O/P EST MOD 30 MIN: CPT | Mod: 25

## 2023-05-12 ENCOUNTER — APPOINTMENT (OUTPATIENT)
Dept: HEART AND VASCULAR | Facility: CLINIC | Age: 18
End: 2023-05-12
Payer: MEDICAID

## 2023-05-12 ENCOUNTER — NON-APPOINTMENT (OUTPATIENT)
Age: 18
End: 2023-05-12

## 2023-05-12 PROCEDURE — 93306 TTE W/DOPPLER COMPLETE: CPT

## 2023-05-13 NOTE — HISTORY OF PRESENT ILLNESS
[FreeTextEntry1] : May 8, 2023\par Patent returns for follow up\par Japanese  #[id number]\par Feeling better\par Feels much better, only thing bothering her, if gets up to quickly, has some dizzy, lasts couple of seconds\par Since last visit with cardiologist, lowered metoprolol to 25 mg qday \par Again discussed cytoxan with patient with the assistance of interprer\par \par April 4, 2023\par Patient was seen in cardiology as here for appointment today\par Patient here with her mother\par  surface offered, declined use of  at this time, although did use tranlsation service on patient's phone\par Reviewed treatment options with patient\par Discussed cytoxan treatments\par Discussed risks and benefits,  patient is very concerned about possible hairloss\par \par December 23, 2022\par Patient returns for follow up\par Patient feeling well, patient is here with her mother\par  service offered, patient declines use of  as did not feel translated for her properly, patient is also translating for her mother who speaks Japanese\par Patient reports feeling well\par UNclear exercise tolerance, able to ambulate but does not perform household activities like laundry or shopping to assess exercise tolerance\par Some increase in nasal congestion over the past few weeks, feels related to cold weather\par Continues nucala 300 mg every four weeks\par Reviewed recent echo with decline in function with patient\par Reviewed recent labs\par \par October 12, 2022\par Patient returns for follow up \par  #[id number]\par Patient is having difficulty with Nucala, receiving from pharmacy. Patient has been receiving only two doses instead of three doses, \par Last dose, September 8 took 300 mg, has taken one dose to date\par Only delivered 200 mg this month\par Patient felt some improvement from first dose, congestion improving.\par Continues Rasuvo weekly\par Reviewed labs from cardiologist CBC and chemistry in the normal range.\par Discussed recent results from cardiologist and heart failure specialist\par Discussed upcoming imaging scheduled at [facility]\par Discussed possible addition of more aggressive therapy if vasculitis noted\par Possible rituximab versus cyclophosphamide in addition to Nucala.\par Was started on Entresto by cardiologist, blood pressure on the lower side today, patient did not eat yet today\par Currently asymptomatic.\par \par Per cardiology evaluation: October 3, 2022\par EGPA with cardiac involvement as evidenced by severe LV dysfunction with cavity dilation and non-coronary subendocardial LGE on CMRI. On CT chest from March the heart was already enlarged. She seems to have no arrhythmia or conduction disease. \par Has NYHA class II-III symptoms (unclear if all cardiac) and severely elevated pBNP. She appears euvolemic on exam. Cr, Na and Hgb are normal.\par \par August 29, 2022\par Patient returns for rheumatology follow-up\par Japanese  service used during visit with patient and mother\par Patient with increasing difficulty breathing through her nose due to severe congestion\par Patient currently treated with Rasuvo 20 mg once a week and folic acid once a day\par Previously treated with Medrol with tapering doses however patient could not tolerate side effects and is not interested in restarting at this time\par Patient was prescribed Nucala by Dr. Royal, awaiting delivery from specialty pharmacy\par No side effects from Rasuvo noted by patient\par Discussed RN visit for first dose of Nucala, patient feels mother able to inject as works as a nurse.\par Also with intermittent rashes and joint pains as well.\par \par From Previous Rheumatology notes:\par  15 y/o F with previous diagnosed EGPA  presents to establish care. \par She is recent immigrant from [place] \par at age 14-15, she developed recurrent sinusitis and pulm symptoms with SOB, that time, diagnosed with Asthma and started treated with nebs, symbicort, recurrent systemic steroid need, pt remained symptomatic and asthma was poorly controlled despite treatment. \par On Nov 23rd 2019 developed arthritis and rash affecting arms and lower extremities,  diagnoses was unclear for months. Subsequently she was evaluated in [place] and told asthma. \par 02/22/21 CT chest with mediastinal LAD, GGO and parenchymal density,  small pericardial effusion as per report. \par She subsequently found peripheral eosinophilia up to 70% as per pt Mom. \par Had bone marrow biopsy and no lymphoproliferative disease found. At that time suspected EGPA and around May 2021 started treatment with high doses of Medrol and MTX titrated up to 20mg. \par Steroid dose gradually tapered down. She is off treatment since November, 2021 and was waiting for insurance and run out meds. \par Had COVID March 2021. \par For past few weeks she has been experiencing intermittent skin rashes, arthralgia,  dyspnea on exertion, sinus congestion. \par She gained weight since steroids. \par

## 2023-05-13 NOTE — PHYSICAL EXAM
[General Appearance - Alert] : alert [General Appearance - In No Acute Distress] : in no acute distress [General Appearance - Well-Appearing] : healthy appearing [Sclera] : the sclera and conjunctiva were normal [Neck Appearance] : the appearance of the neck was normal [Respiration, Rhythm And Depth] : normal respiratory rhythm and effort [Exaggerated Use Of Accessory Muscles For Inspiration] : no accessory muscle use [Auscultation Breath Sounds / Voice Sounds] : lungs were clear to auscultation bilaterally [Edema] : there was no peripheral edema [Abnormal Walk] : normal gait [Nail Clubbing] : no clubbing  or cyanosis of the fingernails [Musculoskeletal - Swelling] : no joint swelling seen [] : no rash [Oriented To Time, Place, And Person] : oriented to person, place, and time [Affect] : the affect was normal [Impaired Insight] : insight and judgment were intact

## 2023-05-13 NOTE — REVIEW OF SYSTEMS
[Negative] : Heme/Lymph [FreeTextEntry4] : nasal congestion, improved [FreeTextEntry5] : some dizziness with standing up too quickly

## 2023-05-13 NOTE — DATA REVIEWED
[FreeTextEntry1] : CT Chest No Cont	(Report)		\par EXAM: CT CHEST\par \par PROCEDURE DATE: 03/29/2022\par \par \par \par \par INTERPRETATION: CT of the CHEST without intravenous contrast dated 3/29/2022 1:46 PM\par \par INDICATION: R91.8\par \par TECHNIQUE: CT of the chest was performed without intravenous contrast. Intravenous contrast was not used Axial and coronal and sagittal images were produced and reviewed. Additional imaging performed was axial supine in expiration and axial prone in inspiration.\par \par PRIOR STUDIES: No prior outside studies available for comparison.\par \par FINDINGS: The heart is enlarged. Trace inferior pericardial fluid. Evaluation of the vasculature is limited without intravenous contrast, but the great vessels are grossly unremarkable. Evaluation for adenopathy is limited without intravenous contrast. Within that limitation, no mediastinal or hilar or axillary lymphadenopathy is seen. Normal caliber of the main pulmonary artery.\par \par No pleural effusions are seen. Evaluation of the pulmonary parenchyma demonstrates no significant abnormality. The expiratory sequence is not been obtained in full expiration however no significant air trapping identified. No endobronchial lesion. No evidence of interstitial lung disease. No evidence of small or large airways disease. No lung mosaic attenuation. No groundglass nodules.\par \par Limited evaluation of the upper abdomen demonstrates no abnormality.\par \par Evaluation of the osseous structures demonstrates mild pectus carinatum.\par \par \par IMPRESSION: No significant lung pathology.\par Cardiomegaly. Recommend correlation with echo.\par \par \par --- End of Report ---\par \par \par \par \par \par \par JEFFREY MOORE MD; Attending Radiologist\par This document has been electronically signed. Mar 30 2022 3:15PM\par \par \par  MR Cardiac Velocity Flow Map             Final\par \par No Documents Attached\par \par \par Result Annotated 22Sep2022 04:06PM by SALLY VALENCIA\par \par \par had an extesnive discussion with Caty (with ) and informed of significance of MR findings\par \par \par   EXAM: [id number] - MR CARD VELOCITY FLOW MAPPING  - ORDERED BY: SALLY VALENCIA\par \par EXAM: [id number] - MR CARD MORPH FUNCTION WAW IC  - ORDERED BY: SALLY VALENCIA\par \par \par PROCEDURE DATE:  09/20/2022\par \par \par \par INTERPRETATION:  I. CLINICAL INFORMATION:  17 years old Female underwent a cardiac MRI for the evaluation of cardiomyopathy\par \par II. TECHNIQUE: Comprehensive Cardiac MRI examination was performed on a 1.5 T scanner using\par standard cardiac coil, cardiac gating, and standard pulse sequences for the\par assessment of cardiac morphology, function, and viability.  In addition, IV\par gadolinium contrast was given for the evaluation of cardiac viability using standard delayed hyper-enhancement\par protocol. Additional pulse sequence for flow quant analysis was performed.\par \par Ht: 167.64cm\par Wt: 113.4kg\par BSA: 2.199m2\par \par Contrast: 12 mL of Gadavist\par \par III. COMPARISON:  No images available for comparison.\par \par IV. FINDINGS:\par \par A. Cardiac morphology:\par \par 1. Left ventricle\par a. Global systolic function:  Reduced\par b. Cavity size:  Dilated\par c. Wall thickness:  Noncompaction of the apical segments.\par \par 2. Right ventricle\par a. Global systolic function: Normal\par b. Cavity size:  Normal\par \par 3. Aortic valve\par a. Leaflets:  Normal\par b. Aortic Regurgitation:  No significant\par c. Aortic stenosis:  Absent\par \par 4. Mitral valve\par a. Leaflets:  Normal\par b. Leaflet mobility:  Normal\par c. Mitral regurgitation:  Mild\par d. Mitral stenosis:  Absent\par \par 5. Tricuspid valve\par a. Leaflets:  Normal\par b. Leaflet mobility:  Normal\par c. Tricuspid regurgitation:  Mild\par d. Tricuspid stenosis:  Absent\par \par 6.Pulmonic valve\par a. Leaflets:  Normal\par b. PI:  No significant\par c. PS:  Absent\par \par 7. Left atrium\par a. Size:  Dilated\par b. Left atrial volume: 133 mL\par c. Left atrial volume index (Biplane method): 60.5 ml/m2\par \par 8. Right atrium\par a. Size:  Normal\par b. Right atrial area: 16 cm2\par \par 9. Pericardium\par a.Effusion:  None\par \par 10. Aorta\par No significant abnormalities in the visualized portions of the thoracic aorta\par \par 11. Pulmonary artery\par No significant abnormalities in the visualized portions of the pulmonary artery\par \par B. Ventricular volume measurements\par \par LVEF: 29%\par LVEDV: 288.56mL\par LVESV: 204.91mL\par SV: 83.65mL\par \par LVEDVi: 131.22mL/m2\par LVESVi: 93.18mL/m2\par LVSVi: 38.04mL/m2\par \par RVEF: 56%\par RVEDV: 132.09mL\par RVESV: 57.96mL\par SV: 74.13mL\par \par RVEDVi: 60.06mL/m2\par RVESVi: 26.35mL/m2\par RVSVi: 33.71mL/m2\par \par C.Myocardial wall motion: Global\par \par D.Delayed Enhancement: There is subendocardial enhancement of the basal to mid lateral wall and the basal anterior, basal inferior and basal anteroseptum.  Additionally there is enhancement of the papillary muscles.  Overall this pattern is non-ischemic.  This pattern of subendocardial enhancement in non-coronary distribution has been reported in EPGA.\par \par E.Q-flow analysis:\par \par Qp:\par Stroke volume: 63.0\par Forward flow: 64.3mL\par Backward flow: 0.34mL\par Regurgitant fraction: 0.53%\par \par Qs:\par Stroke volume: 64.7mL\par Forward flow: 65.5mL\par Backward flow: 0.84mL\par Regurgitant fraction: 1.28%\par \par Qp/Qs: 0.99\par \par V.\par Impression:\par 1.  The left ventricle (LV) is dilated. There is  no evidence of LV hypertrophy . Left ventricular global systolic function is reduced. The LV ejection fraction is 29 %.\par 2.  The right ventricle (RV) is normal in size. RV global systolic function is normal. The RV ejection fraction is 56 %.\par 3.  No significant valvular abnormalities.\par 4.  No significant abnormalities of the visualized portions of the great vessels.\par 5.  There is subendocardial enhancement of the basal to mid lateral wall and the basal anterior, basal inferior and basal anteroseptum.  Additionally there is enhancement of the papillary muscles.  Overall this pattern is non-ischemic.  This pattern of subendocardial enhancement in non-coronary distribution has been reported in EPGA.\par \par The sequences used in this study were designed for imaging cardiac structures and are suboptimal for imaging other structures and organs.\par \par --- End of Report ---\par \par \par \par \par \par \par ANDREIA VELA MD; Attending Cardiologist\par This document has been electronically signed. Sep 21 2022  6:23PM\par \par  \par \par  Ordered by: SALLY VALENCIA       Collected/Examined: 20Sep2022 11:31AM       \par Verified by: SALLY VALENCIA 22Sep2022 04:06PM       \par  Result Communication: Discussed results with patient;\par Stage: Final       \par  Performed at: [facility]       Resulted: 21Sep2022 03:49PM       Last Updated: 22Sep2022 04:06PM       Accession: [id number]       \par \par \par  MR Cardiac w/wo IV Cont             Final\par \par No Documents Attached\par \par \par Result Annotated 22Sep2022 04:06PM by SALLY VALENCIA\par \par \par had an extesnive discussion with Caty (with ) and informed of significance of MR findings\par \par \par   EXAM: [id number] - MR CARD VELOCITY FLOW MAPPING  - ORDERED BY: SALLY VALNECIA\par \par EXAM: [id number] - MR CARD MORPH FUNCTION WAW IC  - ORDERED BY: SALLY VALENCIA\par \par \par PROCEDURE DATE:  09/20/2022\par \par \par \par INTERPRETATION:  I. CLINICAL INFORMATION:  17 years old Female underwent a cardiac MRI for the evaluation of cardiomyopathy\par \par II. TECHNIQUE: Comprehensive Cardiac MRI examination was performed on a 1.5 T scanner using\par standard cardiac coil, cardiac gating, and standard pulse sequences for the\par assessment of cardiac morphology, function, and viability.  In addition, IV\par gadolinium contrast was given for the evaluation of cardiac viability using standard delayed hyper-enhancement\par protocol. Additional pulse sequence for flow quant analysis was performed.\par \par Ht: 167.64cm\par Wt: 113.4kg\par BSA: 2.199m2\par \par Contrast: 12 mL of Gadavist\par \par III. COMPARISON:  No images available for comparison.\par \par IV. FINDINGS:\par \par A. Cardiac morphology:\par \par 1. Left ventricle\par a. Global systolic function:  Reduced\par b. Cavity size:  Dilated\par c. Wall thickness:  Noncompaction of the apical segments.\par \par 2. Right ventricle\par a. Global systolic function: Normal\par b. Cavity size:  Normal\par \par 3. Aortic valve\par a. Leaflets:  Normal\par b. Aortic Regurgitation:  No significant\par c. Aortic stenosis:  Absent\par \par 4. Mitral valve\par a. Leaflets:  Normal\par b. Leaflet mobility:  Normal\par c. Mitral regurgitation:  Mild\par d. Mitral stenosis:  Absent\par \par 5. Tricuspid valve\par a. Leaflets:  Normal\par b. Leaflet mobility:  Normal\par c. Tricuspid regurgitation:  Mild\par d. Tricuspid stenosis:  Absent\par \par 6.Pulmonic valve\par a. Leaflets:  Normal\par b. PI:  No significant\par c. PS:  Absent\par \par 7. Left atrium\par a. Size:  Dilated\par b. Left atrial volume: 133 mL\par c. Left atrial volume index (Biplane method): 60.5 ml/m2\par \par 8. Right atrium\par a. Size:  Normal\par b. Right atrial area: 16 cm2\par \par 9. Pericardium\par a.Effusion:  None\par \par 10. Aorta\par No significant abnormalities in the visualized portions of the thoracic aorta\par \par 11. Pulmonary artery\par No significant abnormalities in the visualized portions of the pulmonary artery\par \par B. Ventricular volume measurements\par \par LVEF: 29%\par LVEDV: 288.56mL\par LVESV: 204.91mL\par SV: 83.65mL\par \par LVEDVi: 131.22mL/m2\par LVESVi: 93.18mL/m2\par LVSVi: 38.04mL/m2\par \par RVEF: 56%\par RVEDV: 132.09mL\par RVESV: 57.96mL\par SV: 74.13mL\par \par RVEDVi: 60.06mL/m2\par RVESVi: 26.35mL/m2\par RVSVi: 33.71mL/m2\par \par C.Myocardial wall motion: Global\par \par D.Delayed Enhancement: There is subendocardial enhancement of the basal to mid lateral wall and the basal anterior, basal inferior and basal anteroseptum.  Additionally there is enhancement of the papillary muscles.  Overall this pattern is non-ischemic.  This pattern of subendocardial enhancement in non-coronary distribution has been reported in EPGA.\par \par E.Q-flow analysis:\par \par Qp:\par Stroke volume: 63.0\par Forward flow: 64.3mL\par Backward flow: 0.34mL\par Regurgitant fraction: 0.53%\par \par Qs:\par Stroke volume: 64.7mL\par Forward flow: 65.5mL\par Backward flow: 0.84mL\par Regurgitant fraction: 1.28%\par \par Qp/Qs: 0.99\par \par V.\par Impression:\par 1.  The left ventricle (LV) is dilated. There is  no evidence of LV hypertrophy . Left ventricular global systolic function is reduced. The LV ejection fraction is 29 %.\par 2.  The right ventricle (RV) is normal in size. RV global systolic function is normal. The RV ejection fraction is 56 %.\par 3.  No significant valvular abnormalities.\par 4.  No significant abnormalities of the visualized portions of the great vessels.\par 5.  There is subendocardial enhancement of the basal to mid lateral wall and the basal anterior, basal inferior and basal anteroseptum.  Additionally there is enhancement of the papillary muscles.  Overall this pattern is non-ischemic.  This pattern of subendocardial enhancement in non-coronary distribution has been reported in EPGA.\par \par The sequences used in this study were designed for imaging cardiac structures and are suboptimal for imaging other structures and organs.\par \par --- End of Report ---\par \par \par \par \par \par \par ANDREIA VELA MD; Attending Cardiologist\par This document has been electronically signed. Sep 21 2022  6:23PM\par \par  \par \par  Ordered by: SALLY VALENCIA       Collected/Examined: 20Sep2022 11:31AM       \par Verified by: SALLY VALENCIA 22Sep2022 04:06PM       \par  Result Communication: Discussed results with patient;\par Stage: Final       \par  Performed at: [facility]       Resulted: 21Sep2022 03:49PM       Last Updated: 22Sep2022 04:06PM       Accession: [id number]

## 2023-05-15 ENCOUNTER — APPOINTMENT (OUTPATIENT)
Dept: HEART AND VASCULAR | Facility: CLINIC | Age: 18
End: 2023-05-15
Payer: MEDICAID

## 2023-05-16 ENCOUNTER — NON-APPOINTMENT (OUTPATIENT)
Age: 18
End: 2023-05-16

## 2023-05-16 ENCOUNTER — APPOINTMENT (OUTPATIENT)
Dept: HEART AND VASCULAR | Facility: CLINIC | Age: 18
End: 2023-05-16
Payer: MEDICAID

## 2023-05-16 VITALS
HEART RATE: 110 BPM | DIASTOLIC BLOOD PRESSURE: 64 MMHG | OXYGEN SATURATION: 96 % | HEIGHT: 65 IN | TEMPERATURE: 97.6 F | BODY MASS INDEX: 30.66 KG/M2 | SYSTOLIC BLOOD PRESSURE: 98 MMHG | WEIGHT: 184 LBS

## 2023-05-16 VITALS — HEART RATE: 93 BPM

## 2023-05-16 PROCEDURE — 93000 ELECTROCARDIOGRAM COMPLETE: CPT

## 2023-05-16 PROCEDURE — 99215 OFFICE O/P EST HI 40 MIN: CPT | Mod: 25

## 2023-05-16 NOTE — HISTORY OF PRESENT ILLNESS
[FreeTextEntry1] : Ms. 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 She is from [place] (the [facility], not the state in the [place] and had an echocardiogram there in February, 2021 which revealed an EF of 60% and a mildly dilated LV (LVEDd 5.6 cm), an interatrial septal aneurysm, moderate MR, and a small pericardial effusion.  She has a maternal cousin that had an "enlarged" heart but no other family history of cardiomyopathy.  [place] the [place] in early [place].  She was diagnosed with COVID-19 on 05/06/22 and had moderate illness without requiring hospitalization. She has a history of asthma that was diagnosed at the age of 11.  In 2021 she had an episode of hemoptysis with peripheral eosinophilia and was subsequently diagnosed with EGPA. On 03/29/22, she had a CT chest which revealed cardiomegaly.   Although limited by lack of contrast, the great vessels appeared normal size. She is following with a rheumatologist, Carol Multani MD and was started on induction therapy with subcutaneous methotrexate (Rasuvo auto injector) and methylprednisone. She has BARKSDALE to 2-3 flights of stairs and denies chest pain.  On 09/06/22, she had an echocardiogram which revealed an EF of 27%, severely dilated LV, grade I diastolic dysfunction, mildly dilated LA, and a normal aortic root and proximal ascending aorta.  We had an extensive discussion about the newly diagnosed dilated cardiomyopathy which is likely nonischemic and secondary to EGPA.  On 09/20/22 she had a cardiac MRI which revealed a dilated LV, LV EF 29%, normal RV size, RV EF 56%, normal great vessels, subendocardial basal and mid "lateral" wall, basal inferior wall, basal anteroseptum, and papillary muscles consistent with EGPA. On 10/27/22, she had a CCTA, which revealed normal coronary arteries, LV EF 30%, LV dilated, RV dilated, dilated LA, and a dilated RA.  Compared to the CT chest performed on 03/29/22, there was the interval development of a bilobed anterior mediastinal density which appeared to represent thymic tissue.  She states, she had a biopsy of a similar sounding mass in 2020 back home in [place].  She was referred to a thoracic surgeon, Carlos Garrett MD, to address a thymic mass.  On 12/19/22, she had an echocardiogram which revealed an EF of 29%, moderately dilated LV, grade III diastolic dysfunction, moderately dilated LA, and mild MR. At present, she feels much improved.  She has lost about 20 pounds, her BARKSDALE is mild and stable, and she denies lower extremity edema. On 05/12/23, she had an echocardiogram which revealed an EF of 29%, severely dilated LV, grade I diastolic dysfunction, and mild-to-moderate MR.  At present, she denies chest pain, palpitations, or lower extremity edema.  \par   \par \par

## 2023-05-16 NOTE — REASON FOR VISIT
[FreeTextEntry1] : Diagnostic Tests:\par ------------------------------------------\par ECG: \par 05/16/23: sinus rhythm, normal ECG. \par 11/07/22: sinus rhythm, low voltage QRS precordial leads. \par 05/24/22: sinus rhythm, possible old anterior MI. \par ------------------------------------------\par CT:\par 10/27/22: CCTA: normal coronary arteries, LV EF 30%, LV dilated, RV dilated, dilated LA, dilated RA.  \par 03/29/22: noncontrast chest: cardiomegaly, normal great vessels, bilobed anterior mediastinal density likely thymic tissue.  \par ------------------------------------------\par Echo:\par 05/12/23: EF 29%, severely dilated LV, grade I diastolic dysfunction, mild-to-moderate MR. \par 12/19/22: EF 29%, moderately dilated LV, grade III diastolic dysfunction, moderately dilated LA, mild MR.  \par 09/06/22: EF 27%, severely dilated LV, grade I diastolic dysfunction, mildly dilated LA, normal aortic root and proximal ascending aorta.  \par ------------------------------------------\par MRI:\par 09/20/22: cardiac: dilated LV, LV EF 29%, normal RV size, RV EF 56%, normal great vessels, subendocardial basal and mid "lateral" wall, basal inferior wall, basal anteroseptum, and papillary muscles  scar consistent with EGPA

## 2023-05-16 NOTE — ASSESSMENT
[FreeTextEntry1] : 1.  Eosinophilic granulomatosis with polyangiitis:\par         - continue Nucala 300mg SC monthly (initiated 09/08/22)\par         - continue methotrexate SC (Rasuvo)\par         - follow up with rheumatologist, Libby Multani MD\par         - follow up with pulmonologist, Leah Tovar MD\par         - in general: EGPA has between 16% and 29% incidence of cardiac involvement (cardiomegaly, cardiomyopathy, and vasculitis) and is responsible for 50% of the EGPA related mortality\par \par 2. Chronic systolic heart failure secondary to myocarditis and severe cardiomyopathy secondary to EGPA: \par         - follow up with heart failure expert, Jay Mckeon MD\par         - continue Entresto 49/51mg po bid\par         - continue metoprolol succinate 25mg tab po qd\par         - will follow with serial echocardiograms \par         - given extent of myocardial fibrosis noted on cardiac MR, significant LV recovery may be limited\par         - will discuss with heart failure expert indication for ICD for primary prevention of SCD\par \par 3. Obesity: BMI: 30.6: improved: \par         - likely secondary to steroid use\par \par 4. 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  During this time, I took a relevant history and examined the patient.  I reviewed relevant portions of her medical record and formulated a differential diagnosis and plan.  I explained the relevant cardiac diagnoses to the patient, as well as the work up and management plan.  I answered all questions related to the patient's cardiac conditions. \par

## 2023-05-24 ENCOUNTER — APPOINTMENT (OUTPATIENT)
Dept: RHEUMATOLOGY | Facility: CLINIC | Age: 18
End: 2023-05-24

## 2023-06-06 ENCOUNTER — APPOINTMENT (OUTPATIENT)
Dept: PULMONOLOGY | Facility: CLINIC | Age: 18
End: 2023-06-06
Payer: MEDICAID

## 2023-06-06 PROCEDURE — 94618 PULMONARY STRESS TESTING: CPT

## 2023-06-06 PROCEDURE — 94729 DIFFUSING CAPACITY: CPT

## 2023-06-06 PROCEDURE — 94726 PLETHYSMOGRAPHY LUNG VOLUMES: CPT

## 2023-06-06 PROCEDURE — 94060 EVALUATION OF WHEEZING: CPT

## 2023-06-21 LAB
HBV CORE IGG+IGM SER QL: NONREACTIVE
HBV SURFACE AB SER QL: NONREACTIVE
HBV SURFACE AG SER QL: NONREACTIVE
HCV AB SER QL: NONREACTIVE
HCV S/CO RATIO: 0.08 S/CO
IGE SER-MCNC: 444 KU/L
M TB IFN-G BLD-IMP: NEGATIVE
QUANTIFERON TB PLUS MITOGEN MINUS NIL: 7.9 IU/ML
QUANTIFERON TB PLUS NIL: 0.01 IU/ML
QUANTIFERON TB PLUS TB1 MINUS NIL: 0 IU/ML
QUANTIFERON TB PLUS TB2 MINUS NIL: 0 IU/ML

## 2023-06-22 ENCOUNTER — RX RENEWAL (OUTPATIENT)
Age: 18
End: 2023-06-22

## 2023-06-27 ENCOUNTER — APPOINTMENT (OUTPATIENT)
Dept: HEART AND VASCULAR | Facility: CLINIC | Age: 18
End: 2023-06-27
Payer: MEDICAID

## 2023-06-27 ENCOUNTER — APPOINTMENT (OUTPATIENT)
Dept: RHEUMATOLOGY | Facility: CLINIC | Age: 18
End: 2023-06-27
Payer: MEDICAID

## 2023-06-27 VITALS
DIASTOLIC BLOOD PRESSURE: 52 MMHG | HEART RATE: 75 BPM | SYSTOLIC BLOOD PRESSURE: 97 MMHG | HEIGHT: 65 IN | OXYGEN SATURATION: 100 % | BODY MASS INDEX: 28.99 KG/M2 | TEMPERATURE: 97.5 F | WEIGHT: 174 LBS

## 2023-06-27 PROCEDURE — 36415 COLL VENOUS BLD VENIPUNCTURE: CPT

## 2023-06-27 PROCEDURE — 99215 OFFICE O/P EST HI 40 MIN: CPT

## 2023-06-27 NOTE — REASON FOR VISIT
[Other: ____] : [unfilled] [FreeTextEntry1] : Diagnostic Tests:\par ------------------------------------------\par ECG: \par 05/16/23: sinus rhythm, normal ECG. \par 11/07/22: sinus rhythm, low voltage QRS precordial leads. \par 05/24/22: sinus rhythm, possible old anterior MI. \par ------------------------------------------\par CT:\par 10/27/22: CCTA: normal coronary arteries, LV EF 30%, LV dilated, RV dilated, dilated LA, dilated RA.  \par 03/29/22: noncontrast chest: cardiomegaly, normal great vessels, bilobed anterior mediastinal density likely thymic tissue.  \par ------------------------------------------\par Echo:\par 05/12/23: EF 29%, severely dilated LV, grade I diastolic dysfunction, mild-to-moderate MR. \par 12/19/22: EF 29%, moderately dilated LV, grade III diastolic dysfunction, moderately dilated LA, mild MR.  \par 09/06/22: EF 27%, severely dilated LV, grade I diastolic dysfunction, mildly dilated LA, normal aortic root and proximal ascending aorta.  \par ------------------------------------------\par MRI:\par 09/20/22: cardiac: dilated LV, LV EF 29%, normal RV size, RV EF 56%, normal great vessels, subendocardial basal and mid "lateral" wall, basal inferior wall, basal anteroseptum, and papillary muscles  scar consistent with EGPA

## 2023-06-27 NOTE — PHYSICAL EXAM
[Well Developed] : well developed [Well Nourished] : well nourished [No Acute Distress] : no acute distress [Obese] : obese [Normal Venous Pressure] : normal venous pressure [Normal Conjunctiva] : normal conjunctiva [No Carotid Bruit] : no carotid bruit [Normal S1, S2] : normal S1, S2 [No Murmur] : no murmur [No Rub] : no rub [No Gallop] : no gallop [Clear Lung Fields] : clear lung fields [Good Air Entry] : good air entry [No Respiratory Distress] : no respiratory distress  [Soft] : abdomen soft [Non Tender] : non-tender [No Masses/organomegaly] : no masses/organomegaly [Normal Bowel Sounds] : normal bowel sounds [Normal Gait] : normal gait [No Edema] : no edema [No Cyanosis] : no cyanosis [No Clubbing] : no clubbing [No Varicosities] : no varicosities [No Rash] : no rash [No Skin Lesions] : no skin lesions [Moves all extremities] : moves all extremities [No Focal Deficits] : no focal deficits [Normal Speech] : normal speech [Alert and Oriented] : alert and oriented [Normal memory] : normal memory

## 2023-06-27 NOTE — HISTORY OF PRESENT ILLNESS
[FreeTextEntry1] : Ms. 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 She is from [place] (the [facility], not the state in the [place] and had an echocardiogram there in February, 2021 which revealed an EF of 60% and a mildly dilated LV (LVEDd 5.6 cm), an interatrial septal aneurysm, moderate MR, and a small pericardial effusion.  She has a maternal cousin that had an "enlarged" heart but no other family history of cardiomyopathy.  [place]  She was diagnosed with COVID-19 on 05/06/22 and had moderate illness without requiring hospitalization. She has a history of asthma that was diagnosed at the age of 11.  In 2021 she had an episode of hemoptysis with peripheral eosinophilia and was subsequently diagnosed with EGPA. On 03/29/22, she had a CT chest which revealed cardiomegaly.   Although limited by lack of contrast, the great vessels appeared normal size. She is following with a rheumatologist, Carol Multani MD and was started on induction therapy with subcutaneous methotrexate (Rasuvo auto injector) and methylprednisone. She has BARKSDALE to 2-3 flights of stairs and denies chest pain.  On 09/06/22, she had an echocardiogram which revealed an EF of 27%, severely dilated LV, grade I diastolic dysfunction, mildly dilated LA, and a normal aortic root and proximal ascending aorta.  We had an extensive discussion about the newly diagnosed dilated cardiomyopathy which is likely nonischemic and secondary to EGPA.  On 09/20/22 she had a cardiac MRI which revealed a dilated LV, LV EF 29%, normal RV size, RV EF 56%, normal great vessels, subendocardial basal and mid "lateral" wall, basal inferior wall, basal anteroseptum, and papillary muscles consistent with EGPA. On 10/27/22, she had a CCTA, which revealed normal coronary arteries, LV EF 30%, LV dilated, RV dilated, dilated LA, and a dilated RA.  Compared to the CT chest performed on 03/29/22, there was the interval development of a bilobed anterior mediastinal density which appeared to represent thymic tissue.  She states, she had a biopsy of a similar sounding mass in 2020 back home in [place].  She was referred to a thoracic surgeon, Carlos Garrett MD, to address a thymic mass.  On 12/19/22, she had an echocardiogram which revealed an EF of 29%, moderately dilated LV, grade III diastolic dysfunction, moderately dilated LA, and mild MR. At present, she feels much improved.  She has lost about 20 pounds, her BARKSDALE is mild and stable, and she denies lower extremity edema. On 05/12/23, she had an echocardiogram which revealed an EF of 29%, severely dilated LV, grade I diastolic dysfunction, and mild-to-moderate MR.  She is seen as an urgent add-on visit today, 06/27/23, secondary to nausea and headache starting last Saturday.  She denies fever or diarrhea.  She has lost 8 pounds since last visit but the weight loss is intentional.  At present, she feels back to her baseline.  She has complaints of nasal obstruction and we discussed that this is secondary to EGPA.  \par   \par \par

## 2023-06-27 NOTE — ASSESSMENT
[FreeTextEntry1] : 1.  Eosinophilic granulomatosis with polyangiitis:\par         - continue Nucala 300mg SC monthly (initiated 09/08/22)\par         - continue methotrexate SC (Rasuvo)\par         - follow up with rheumatologist, Libby Multani MD\par         - follow up with pulmonologist, Leah Tovar MD\par         - may initiate cyclophosphamide in the near future\par \par 2. Chronic systolic heart failure secondary to myocarditis and severe cardiomyopathy secondary to EGPA: \par         - follow up with heart failure expert, Jay Mckeon MD\par         - continue Entresto 49/51mg po bid\par         - continue metoprolol succinate 25mg tab po qd\par         - will follow with serial echocardiograms \par         - given extent of myocardial fibrosis noted on cardiac MR, significant LV recovery may be limited\par         - will discuss with heart failure expert indication for ICD for primary prevention of SCD\par \par 3. Obesity: BMI: 29: improved: \par         - likely secondary to steroid use\par \par 4. Thymic mass: ? [place] biopsy [place] the [place] [facility] in 2020: deemed low risk: \par         - continue conservative management \par \par 5. Nasal polyposis: secondary to EGPA: \par         - will send to ENT, Jose E Tolbert MD\par \par I spent > 60 minutes of total time on this visit, including time spent face-to-face and non face-to-face.  During this time, I took a relevant history and examined the patient.  I reviewed relevant portions of her medical record and formulated a differential diagnosis and plan.  I explained the relevant cardiac diagnoses to the patient, as well as the work up and management plan.  I answered all questions related to the patient's cardiac conditions. \par

## 2023-06-28 LAB
ALBUMIN SERPL ELPH-MCNC: 4.6 G/DL
ALP BLD-CCNC: 61 U/L
ALT SERPL-CCNC: 10 U/L
ANION GAP SERPL CALC-SCNC: 13 MMOL/L
AST SERPL-CCNC: 16 U/L
BILIRUB SERPL-MCNC: 0.5 MG/DL
BUN SERPL-MCNC: 6 MG/DL
CALCIUM SERPL-MCNC: 9.5 MG/DL
CHLORIDE SERPL-SCNC: 105 MMOL/L
CO2 SERPL-SCNC: 24 MMOL/L
CREAT SERPL-MCNC: 0.66 MG/DL
CRP SERPL-MCNC: 4 MG/L
EGFR: 130 ML/MIN/1.73M2
ERYTHROCYTE [SEDIMENTATION RATE] IN BLOOD BY WESTERGREN METHOD: 2 MM/HR
GLUCOSE SERPL-MCNC: 97 MG/DL
LPL SERPL-CCNC: 21 U/L
POTASSIUM SERPL-SCNC: 4.4 MMOL/L
PROT SERPL-MCNC: 6.8 G/DL
SODIUM SERPL-SCNC: 142 MMOL/L

## 2023-07-24 ENCOUNTER — APPOINTMENT (OUTPATIENT)
Age: 18
End: 2023-07-24
Payer: MEDICAID

## 2023-07-24 ENCOUNTER — APPOINTMENT (OUTPATIENT)
Dept: OTOLARYNGOLOGY | Facility: CLINIC | Age: 18
End: 2023-07-24
Payer: MEDICAID

## 2023-07-24 VITALS
SYSTOLIC BLOOD PRESSURE: 116 MMHG | OXYGEN SATURATION: 100 % | DIASTOLIC BLOOD PRESSURE: 77 MMHG | TEMPERATURE: 97.9 F | HEART RATE: 76 BPM | WEIGHT: 168.19 LBS | BODY MASS INDEX: 28.02 KG/M2 | HEIGHT: 65 IN

## 2023-07-24 VITALS
HEART RATE: 78 BPM | SYSTOLIC BLOOD PRESSURE: 103 MMHG | OXYGEN SATURATION: 100 % | BODY MASS INDEX: 28.99 KG/M2 | WEIGHT: 174 LBS | HEIGHT: 65 IN | TEMPERATURE: 97.6 F | DIASTOLIC BLOOD PRESSURE: 65 MMHG

## 2023-07-24 PROCEDURE — 31231 NASAL ENDOSCOPY DX: CPT

## 2023-07-24 PROCEDURE — 36415 COLL VENOUS BLD VENIPUNCTURE: CPT

## 2023-07-24 PROCEDURE — 99204 OFFICE O/P NEW MOD 45 MIN: CPT | Mod: 25

## 2023-07-24 PROCEDURE — 99214 OFFICE O/P EST MOD 30 MIN: CPT | Mod: 25

## 2023-07-24 RX ORDER — FLUTICASONE PROPIONATE 50 UG/1
50 SPRAY, METERED NASAL
Qty: 3 | Refills: 1 | Status: ACTIVE | COMMUNITY
Start: 2023-07-24 | End: 1900-01-01

## 2023-07-24 NOTE — PHYSICAL EXAM
[Nasal Endoscopy Performed] : nasal endoscopy was performed, see procedure section for findings [] : septum deviated to the left [de-identified] : visible polyps bilat [Normal] : no masses and lesions seen, face is symmetric

## 2023-07-24 NOTE — CONSULT LETTER
[Dear  ___] : Dear  [unfilled], [Consult Letter:] : I had the pleasure of evaluating your patient, [unfilled]. [Please see my note below.] : Please see my note below. [Consult Closing:] : Thank you very much for allowing me to participate in the care of this patient.  If you have any questions, please do not hesitate to contact me. [Sincerely,] : Sincerely, [FreeTextEntry3] : LILY Tolbert Jr, MD, FAAOHNS\par Otolaryngologist\par [facility]

## 2023-07-24 NOTE — ASSESSMENT
[FreeTextEntry1] : Start a nasal steroid but given her other immunosuppressives will hold off on an oral steroid trial & will d/w Dr. Multani.. CT sinus & RTC; we will try to complete her scope at the next visit.

## 2023-07-24 NOTE — HISTORY OF PRESENT ILLNESS
[de-identified] : Christina speaker, MADAN used: ID# [id number]\par "Bone obstruction" in her nose with constant difficulty breathing through her nose and frequent clear drg; she denies facial pressure or discolored rhinorrhea. She's been taking Zyrtec and a Russian medication of some kind (pills). When she was a child she had "sinuses removed" as a surgery. She is not on any nasal sprays. \par Review of her record indicates that she has EGPA with a hx of nasal polyps and is being rx w/ Nucala by Dr. Multani and Cytoxan is being considered.

## 2023-07-24 NOTE — PROCEDURE
[FreeTextEntry6] : Indication: requirement for exam not possible via anterior rhinoscopy; chronic nasal obstruction\par After verbal consent and the administration of an aerosolized phenylephrine/lidocaine mix, examination was performed with a flexible endoscope attached to a video monitoring system. Findings:\par Septum: sharp caudal dev to the L\par Mucosa: normal\par Polyposis: grade 2-3 bilat\par Inferior turbinates: large, pos Afrin test\par Middle and superior turbinates: normal\par Inferior meatus: unremarkable\par Middle meatus: as noted\par Superior meatus: unremarkable\par Speno-ethmoidal recess: unremarkable\par Nasopharynx: unremarkable\par Secretions: unremarkable\par Other findings: incomplete exam on the L d/t patient intolerance

## 2023-07-25 NOTE — HISTORY OF PRESENT ILLNESS
[FreeTextEntry1] : Date of HF diagnosis: \par Etiology of CMP: EGPA (Churg-Virgie)\par Date of last hospitalization: [place] here in [place] \par Date of last echocardiogram: 12/2022\par LVEF/LVEDD: 29%/6.6 cm\par Type of ischemic work-up: CCTA\par Prior RHC: No \par \par Diabetes: No \par Afib: No \par CKD: No \par ICD/CRT:\par \par ACEi/ARB: No \par ARNI: Yes \par MRA: No \par BB: Yes \par SGLT2i: No \par Nitrates/Hydralazine: No \par \par CardioMEMs: No \par In clinical trial: No \par \par 16 yo woman with a history of EGPA diagnosed 2021 in [place]. Also saw some doctors in [place] about it. Now followed by rheumatology here. She had a +ANCA reportedly in [place] but in later assessments it has been negative. \par \par Treated with steroids in [place] and [place], improved her joint pains but had severe side effects like weight gain and mood changed. Currently was being treated with MTX (rasuvo) and more recently, after discovery of CMP, she was started on Nucala (Mepolizumab). Currently is being treated with Nucala every 4 weeks. \par \par Her LVEF is ~25-30% assessed by TTE and CMRI. CMRI showed diffuse non-coronary distributed subendocardial LGE. RV looks ok.  CCTA was performed and ruled out coronary vasculitis.\par \par Since October 2022 she has been treated with ARNI and BB. Uptitration has been limited by severe dizziness. Currently tolerating moderate dose ARNI and low dose BB. Toprol was lowered from 50 to 25 due to severe orthostatic dizziness.\par \par Symptoms of BARKSDALE, palpitations and CP have improved substantially. Last pBNP remained elevated ~1k. \par She has also lost weight with diet and mild exercise. Denies orthopnea or PND. \par Has never been on diuretics.\par Although LV systolic function is grossly unchanged from December to May, diastology has improved from grade 3 to grade 1.\par \par Has ongoing nasal congestion and today was prescribed a nasal steroid by ENT. Pending CT of her sinuses.\par \par \par ***EGPA hx summary from Rheumatology note:\par She is recent immigrant from [place] \par at age 14-15 she developed recurrent sinusitis and pulm symptoms with SOB, that times she was diagnosed with Asthma and started treated with nebs, symbicort, recurrent systemic steroid need, pt remained symptomatic and asthma was poorly controlled despite treatment. \par On Nov 23rd 2019 developed arthritis and rash affecting arms and lower extremities, diagnoses was unclear for months. Subsequently she was evaluated in [place] and told asthma. \par 02/22/21 CT chest with mediastinal LAD, GGO and parenchymal density, small pericardial effusion as per report. \par She subsequently found peripheral eosinophilia up to 70% as per pt Mom. \par Had bone marrow biopsy and no lymphoproliferative disease found. At that time suspected EGPA and around May 2021 started treatment with high doses of Medrol and MTX titrated up to 20mg. \par Steroid dose gradually tapered down. She is off treatment since November, 2021 and was waiting for insurance and run out meds. \par Had COVID March 2021.

## 2023-07-25 NOTE — CARDIOLOGY SUMMARY
[de-identified] : Date of HF diagnosis: \par Etiology of CMP: EGPA (Churg-Virgie)\par Date of last hospitalization[place] None [place] in [place] \par Date of last echocardiogram: 9/6/2022\par LVEF/LVEDD: 27%/6.6cm\par Type of ischemic work-up: None (CMRI)\par Prior RHC: No \par \par Diabetes: No \par Afib: No \par CKD: No \par ICD/CRT:\par \par ACEi/ARB: No \par ARNI: No \par MRA: No \par BB: Yes \par SGLT2i: No \par Nitrates/Hydralazine: No \par \par CardioMEMs: No \par In clinical trial: No \par \par 16 yo woman with a history of EGPA diagnosed 2021 in [place]. Also saw some doctors in [place] about it. Now followed by rheumatology here. She had a +ANCA reportedly in [place] but in later assessments it has been negative. \par \par Treated with steroids in [place] and [place], improved her joint pains but had severe side effects like weight gain and mood changed. Currently was being treated with MTX (rasuvo) and more recently, after discovery of CMP, she was started on Nucala (Mepolizumab), only received one dose so far.\par \par She states that the treatment she is on for EGPA is helping. She denies joint pains, muscle pains or recurrent rashes. She complains mostly of exertional dyspnea, no orthopnea, PND or LE edema. Has no palpiations, presyncope or syncope. Was started on Toprol on last visit and is tolerating well.\par \par ***EGPA hx summary from Rheumatology note:\par She is recent immigrant from [place] \par at age 14-15 she developed recurrent sinusitis and pulm symptoms with SOB, that times she was diagnosed with Asthma and started treated with nebs, symbicort, recurrent systemic steroid need, pt remained symptomatic and asthma was poorly controlled despite treatment. \par On Nov 23rd 2019 developed arthritis and rash affecting arms and lower extremities, diagnoses was unclear for months. Subsequently she was evaluated in [place] and told asthma. \par 02/22/21 CT chest with mediastinal LAD, GGO and parenchymal density, small pericardial effusion as per report. \par She subsequently found peripheral eosinophilia up to 70% as per pt Mom. \par Had bone marrow biopsy and no lymphoproliferative disease found. At that time suspected EGPA and around May 2021 started treatment with high doses of Medrol and MTX titrated up to 20mg. \par Steroid dose gradually tapered down. She is off treatment since November, 2021 and was waiting for insurance and run out meds. \par Had COVID March 2021.

## 2023-07-25 NOTE — PHYSICAL EXAM
[Normal] : no edema, no cyanosis, no clubbing, no varicosities [No Edema] : no edema [de-identified] : Warm

## 2023-07-25 NOTE — ASSESSMENT
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 On CT chest from March 2022 the heart was already enlarged. She seems to have no arrhythmia or conduction disease. \par \par Has NYHA class II symptoms (imrpoved) Has elevated pBNP despite ARNI and BB therapy. Cr has been stable. No diuretic needs. Maximally tolerated GDMT due to dizziness.\par \par - Continue Entresto to 49-51 mg bid \par - Toprol XL to 25 mg \par - Does not wish to repeat MRI of chest to thymus mass follow-up. Will re address at a later time.\par - Continue close follow-up with rheumatology as immunosuppression is the cornerstone of treatment  - cyclophosphamide being considered.\par \par Jay Castro MD. \par

## 2023-08-02 NOTE — ED PROVIDER NOTE - CROS ED [facility] ALL NEG
- - -
[FreeTextEntry1] : 1)-Treatment plan as outlined above. 2)-No prescription refills needed at this time. 3)-Laboratory specimens drawn in the clinic.

## 2023-09-05 ENCOUNTER — APPOINTMENT (OUTPATIENT)
Dept: CT IMAGING | Facility: CLINIC | Age: 18
End: 2023-09-05

## 2023-09-08 PROBLEM — U07.1 COVID-19 VIRUS INFECTION: Status: RESOLVED | Noted: 2022-05-06 | Resolved: 2023-09-08

## 2023-09-08 NOTE — REASON FOR VISIT
[Other: ____] : [unfilled] [FreeTextEntry1] : ======================================================================================= Diagnostic Tests: ------------------------------------------ EC23: sinus rhythm, normal ECG.  22: sinus rhythm, low voltage QRS precordial leads.  22: sinus rhythm, possible old anterior MI.  ------------------------------------------ CT: 10/27/22: CCTA: normal coronary arteries, LV EF 30%, LV dilated, RV dilated, dilated LA, dilated RA.   22: noncontrast chest: cardiomegaly, normal great vessels, bilobed anterior mediastinal density likely thymic tissue.   ------------------------------------------ Echo: 23: EF 29%, severely dilated LV, grade I diastolic dysfunction, mild-to-moderate MR.  22: EF 29%, moderately dilated LV, grade III diastolic dysfunction, moderately dilated LA, mild MR.   22: EF 27%, severely dilated LV, grade I diastolic dysfunction, mildly dilated LA, normal aortic root and proximal ascending aorta.   ------------------------------------------ MRI: 22: cardiac: dilated LV, LV EF 29%, normal RV size, RV EF 56%, normal great vessels, subendocardial basal and mid "lateral" wall, basal inferior wall, basal anteroseptum, and papillary muscles  scar consistent with EGPA

## 2023-09-08 NOTE — ASSESSMENT
[FreeTextEntry1] : ======================================================================================= 1.  Eosinophilic granulomatosis with polyangiitis:         - continue Nucala 300mg SC monthly (initiated 09/08/22)         - continue methotrexate SC (Rasuvo)         - follow up with rheumatologist, Libby Multani MD         - follow up with pulmonologist, Leah Tovar MD         - may initiate cyclophosphamide in the near future  2. 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 Obesity: BMI: 29: improved:          - likely secondary to steroid use  4. Thymic mass: ? prior biopsy in the [place] [facility] in 2020: deemed low risk:          - continue conservative management   5. Nasal polyposis: secondary to EGPA:          - will send to ENT, Jose E Tolbert MD  I spent > 60 minutes of total time on this visit, including time spent face-to-face and non face-to-face.  During this time, I took a relevant history and examined the patient.  I reviewed relevant portions of her medical record and formulated a differential diagnosis and plan.  I explained the relevant cardiac diagnoses to the patient, as well as the work up and management plan.  I answered all questions related to the patient's cardiac conditions.

## 2023-09-08 NOTE — HISTORY OF PRESENT ILLNESS
[FreeTextEntry1] : Ms. 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 She is from [place] (the [facility], not the state in the [place] and had an echocardiogram there in February, 2021 which revealed an EF of 60% and a mildly dilated LV (LVEDd 5.6 cm), an interatrial septal aneurysm, moderate MR, and a small pericardial effusion.  She has a maternal cousin that had an "enlarged" heart but no other family history of cardiomyopathy.  [place]  She was diagnosed with COVID-19 on 05/06/22 and had moderate illness without requiring hospitalization. She has a history of asthma that was diagnosed at the age of 11.  In 2021 she had an episode of hemoptysis with peripheral eosinophilia and was subsequently diagnosed with EGPA. On 03/29/22, she had a CT chest which revealed cardiomegaly.   Although limited by lack of contrast, the great vessels appeared normal size. She is following with a rheumatologist, Carol Multani MD and was started on induction therapy with subcutaneous methotrexate (Rasuvo auto injector) and methylprednisone. She has BARKSDALE to 2-3 flights of stairs and denies chest pain.  On 09/06/22, she had an echocardiogram which revealed an EF of 27%, severely dilated LV, grade I diastolic dysfunction, mildly dilated LA, and a normal aortic root and proximal ascending aorta.  We had an extensive discussion about the newly diagnosed dilated cardiomyopathy which is likely nonischemic and secondary to EGPA.  On 09/20/22 she had a cardiac MRI which revealed a dilated LV, LV EF 29%, normal RV size, RV EF 56%, normal great vessels, subendocardial basal and mid "lateral" wall, basal inferior wall, basal anteroseptum, and papillary muscles consistent with EGPA. On 10/27/22, she had a CCTA, which revealed normal coronary arteries, LV EF 30%, LV dilated, RV dilated, dilated LA, and a dilated RA.  Compared to the CT chest performed on 03/29/22, there was the interval development of a bilobed anterior mediastinal density which appeared to represent thymic tissue.  She states, she had a biopsy of a similar sounding mass in 2020 back home in [place].  She was referred to a thoracic surgeon, Carlos Garrett MD, to address a thymic mass.  On 12/19/22, she had an echocardiogram which revealed an EF of 29%, moderately dilated LV, grade III diastolic dysfunction, moderately dilated LA, and mild MR. At present, she feels much improved.  She has lost about 20 pounds, her BARKSDALE is mild and stable, and she denies lower extremity edema. On 05/12/23, she had an echocardiogram which revealed an EF of 29%, severely dilated LV, grade I diastolic dysfunction, and mild-to-moderate MR.  She is seen as an urgent add-on visit today, 06/27/23, secondary to nausea and headache starting last Saturday.  She denies fever or diarrhea.  She has lost 8 pounds since last visit but the weight loss is intentional.  At present, she feels back to her baseline.  She has complaints of nasal obstruction and we discussed that this is secondary to EGPA.  \par    \par  \par

## 2023-09-12 ENCOUNTER — APPOINTMENT (OUTPATIENT)
Dept: HEART AND VASCULAR | Facility: CLINIC | Age: 18
End: 2023-09-12
Payer: MEDICAID

## 2023-09-12 VITALS
WEIGHT: 171.5 LBS | TEMPERATURE: 97.7 F | SYSTOLIC BLOOD PRESSURE: 101 MMHG | HEART RATE: 76 BPM | BODY MASS INDEX: 28.57 KG/M2 | HEIGHT: 65 IN | DIASTOLIC BLOOD PRESSURE: 64 MMHG | OXYGEN SATURATION: 98 %

## 2023-09-12 DIAGNOSIS — U07.1 COVID-19: ICD-10-CM

## 2023-09-12 PROCEDURE — 99215 OFFICE O/P EST HI 40 MIN: CPT

## 2023-09-13 LAB
ALBUMIN SERPL ELPH-MCNC: 4.6 G/DL
ALP BLD-CCNC: 60 U/L
ALT SERPL-CCNC: 8 U/L
ANION GAP SERPL CALC-SCNC: 12 MMOL/L
AST SERPL-CCNC: 12 U/L
BASOPHILS # BLD AUTO: 0.03 K/UL
BASOPHILS NFR BLD AUTO: 0.4 %
BILIRUB SERPL-MCNC: 0.4 MG/DL
BUN SERPL-MCNC: 12 MG/DL
CALCIUM SERPL-MCNC: 9.7 MG/DL
CHLORIDE SERPL-SCNC: 102 MMOL/L
CHOLEST SERPL-MCNC: 170 MG/DL
CO2 SERPL-SCNC: 23 MMOL/L
CREAT SERPL-MCNC: 0.6 MG/DL
CRP SERPL HS-MCNC: 1.33 MG/L
EGFR: 132 ML/MIN/1.73M2
EOSINOPHIL # BLD AUTO: 0.19 K/UL
EOSINOPHIL NFR BLD AUTO: 2.8 %
ERYTHROCYTE [SEDIMENTATION RATE] IN BLOOD BY WESTERGREN METHOD: 3 MM/HR
ESTIMATED AVERAGE GLUCOSE: 91 MG/DL
GLUCOSE SERPL-MCNC: 93 MG/DL
HBA1C MFR BLD HPLC: 4.8 %
HCT VFR BLD CALC: 42.7 %
HDLC SERPL-MCNC: 56 MG/DL
HGB BLD-MCNC: 13.9 G/DL
IMM GRANULOCYTES NFR BLD AUTO: 0.6 %
LDLC SERPL DIRECT ASSAY-MCNC: 100 MG/DL
LYMPHOCYTES # BLD AUTO: 1.9 K/UL
LYMPHOCYTES NFR BLD AUTO: 28.1 %
MAN DIFF?: NORMAL
MCHC RBC-ENTMCNC: 32.6 GM/DL
MCHC RBC-ENTMCNC: 32.9 PG
MCV RBC AUTO: 101.2 FL
MONOCYTES # BLD AUTO: 0.41 K/UL
MONOCYTES NFR BLD AUTO: 6.1 %
NEUTROPHILS # BLD AUTO: 4.19 K/UL
NEUTROPHILS NFR BLD AUTO: 62 %
NT-PROBNP SERPL-MCNC: 952 PG/ML
PLATELET # BLD AUTO: 322 K/UL
POTASSIUM SERPL-SCNC: 5 MMOL/L
PROT SERPL-MCNC: 7 G/DL
RBC # BLD: 4.22 M/UL
RBC # FLD: 13.4 %
SODIUM SERPL-SCNC: 137 MMOL/L
TRIGL SERPL-MCNC: 83 MG/DL
TSH SERPL-ACNC: 2.06 UIU/ML
WBC # FLD AUTO: 6.76 K/UL

## 2023-10-10 ENCOUNTER — OUTPATIENT (OUTPATIENT)
Dept: OUTPATIENT SERVICES | Facility: HOSPITAL | Age: 18
LOS: 1 days | End: 2023-10-10

## 2023-10-10 ENCOUNTER — APPOINTMENT (OUTPATIENT)
Dept: CT IMAGING | Facility: CLINIC | Age: 18
End: 2023-10-10
Payer: MEDICAID

## 2023-10-10 PROCEDURE — 70486 CT MAXILLOFACIAL W/O DYE: CPT | Mod: 26

## 2023-10-23 ENCOUNTER — APPOINTMENT (OUTPATIENT)
Age: 18
End: 2023-10-23
Payer: MEDICAID

## 2023-10-23 ENCOUNTER — APPOINTMENT (OUTPATIENT)
Dept: OTOLARYNGOLOGY | Facility: CLINIC | Age: 18
End: 2023-10-23

## 2023-10-23 PROCEDURE — 99443: CPT

## 2023-10-30 ENCOUNTER — APPOINTMENT (OUTPATIENT)
Dept: OTOLARYNGOLOGY | Facility: CLINIC | Age: 18
End: 2023-10-30

## 2023-11-10 ENCOUNTER — APPOINTMENT (OUTPATIENT)
Dept: RHEUMATOLOGY | Facility: CLINIC | Age: 18
End: 2023-11-10

## 2023-11-13 ENCOUNTER — NON-APPOINTMENT (OUTPATIENT)
Age: 18
End: 2023-11-13

## 2023-11-22 ENCOUNTER — RX RENEWAL (OUTPATIENT)
Age: 18
End: 2023-11-22

## 2023-11-26 ENCOUNTER — RX RENEWAL (OUTPATIENT)
Age: 18
End: 2023-11-26

## 2023-11-26 RX ORDER — ALBUTEROL SULFATE 90 UG/1
108 (90 BASE) INHALANT RESPIRATORY (INHALATION)
Qty: 1 | Refills: 1 | Status: ACTIVE | COMMUNITY
Start: 2023-06-01 | End: 1900-01-01

## 2023-12-19 ENCOUNTER — APPOINTMENT (OUTPATIENT)
Dept: HEART AND VASCULAR | Facility: CLINIC | Age: 18
End: 2023-12-19
Payer: MEDICAID

## 2023-12-19 ENCOUNTER — APPOINTMENT (OUTPATIENT)
Dept: OTOLARYNGOLOGY | Facility: CLINIC | Age: 18
End: 2023-12-19
Payer: MEDICAID

## 2023-12-19 ENCOUNTER — NON-APPOINTMENT (OUTPATIENT)
Age: 18
End: 2023-12-19

## 2023-12-19 VITALS
BODY MASS INDEX: 29.37 KG/M2 | HEIGHT: 65 IN | HEART RATE: 67 BPM | TEMPERATURE: 98.7 F | OXYGEN SATURATION: 99 % | DIASTOLIC BLOOD PRESSURE: 64 MMHG | SYSTOLIC BLOOD PRESSURE: 100 MMHG | WEIGHT: 176.25 LBS

## 2023-12-19 VITALS
OXYGEN SATURATION: 99 % | RESPIRATION RATE: 18 BRPM | BODY MASS INDEX: 28.49 KG/M2 | SYSTOLIC BLOOD PRESSURE: 107 MMHG | DIASTOLIC BLOOD PRESSURE: 72 MMHG | HEART RATE: 69 BPM | HEIGHT: 65 IN | WEIGHT: 171 LBS | TEMPERATURE: 99.4 F

## 2023-12-19 DIAGNOSIS — T48.5X5A CHRONIC RHINITIS: ICD-10-CM

## 2023-12-19 DIAGNOSIS — J31.0 CHRONIC RHINITIS: ICD-10-CM

## 2023-12-19 DIAGNOSIS — M79.606 PAIN IN LEG, UNSPECIFIED: ICD-10-CM

## 2023-12-19 DIAGNOSIS — J33.9 NASAL POLYP, UNSPECIFIED: ICD-10-CM

## 2023-12-19 DIAGNOSIS — J34.2 DEVIATED NASAL SEPTUM: ICD-10-CM

## 2023-12-19 PROCEDURE — 99215 OFFICE O/P EST HI 40 MIN: CPT

## 2023-12-19 PROCEDURE — 99215 OFFICE O/P EST HI 40 MIN: CPT | Mod: 25

## 2023-12-19 PROCEDURE — 93000 ELECTROCARDIOGRAM COMPLETE: CPT

## 2023-12-19 RX ORDER — FLUTICASONE PROPIONATE 50 UG/1
50 SPRAY, METERED NASAL
Qty: 3 | Refills: 1 | Status: ACTIVE | COMMUNITY
Start: 2023-12-19 | End: 1900-01-01

## 2023-12-19 RX ORDER — DIAZEPAM 5 MG/1
5 TABLET ORAL
Qty: 2 | Refills: 0 | Status: ACTIVE | COMMUNITY
Start: 2023-12-19 | End: 1900-01-01

## 2023-12-19 NOTE — ASSESSMENT
[FreeTextEntry1] : ======================================================================================= 1. Eosinophilic granulomatosis with polyangiitis:  - continue Nucala 300mg SC monthly (initiated 09/08/22)  - continue methotrexate SC (Rasuvo)  - follow up with rheumatologist, Libby Multani MD  - follow up with pulmonologist, Leah Tovar MD  - may initiate cyclophosphamide in the future  2. Chronic systolic heart failure secondary to myocarditis and severe cardiomyopathy secondary to EGPA:  - follow up with heart failure expert, Jay Mckeon MD  - continue Entresto 49/51mg po bid  - continue metoprolol succinate 25mg tab po qd  - will follow with serial echocardiograms (ordered today for time of next visit)  - given extent of myocardial fibrosis noted on cardiac MR, significant LV recovery may be limited  - will discuss with heart failure expert indication for ICD for primary prevention of SCD  3. Obesity: BMI: 29.3:   - likely secondary to steroid use  4. Thymic mass: ? prior biopsy in the [place] [facility] in 2020: deemed low risk:  - continue conservative management  5. Nasal polyposis: secondary to EGPA:  - follow up with ENT, Jose E Tolbert MD  I spent > 45 minutes of total time on this visit, including time spent face-to-face and non-face-to-face. During this time, I took a relevant history and examined the patient. I reviewed relevant portions of her medical record and formulated a differential diagnosis and plan. I explained the relevant cardiac diagnoses to the patient, as well as the work up and management plan. I answered all questions related to the patient's cardiac conditions.

## 2023-12-19 NOTE — ASSESSMENT
[FreeTextEntry1] : [facility] Reference #: [id number] CT reviewed w/ pt & her mother. In the past 10 mg of diazepam has allowed her to have procedures; RTC for endoscopy then  We discussed surgery vs. other options including possibly trying steroid irrigation

## 2023-12-19 NOTE — PHYSICAL EXAM
[] : septum deviated to the left [Normal] : no masses and lesions seen, face is symmetric [de-identified] : resolution of anteriorly visible polyps

## 2023-12-19 NOTE — REASON FOR VISIT
[Other: ____] : [unfilled] [FreeTextEntry1] : ======================================================================================= Diagnostic Tests: ------------------------------------------ EC23: sinus rhythm, PRWP.   23: sinus rhythm, normal ECG.  22: sinus rhythm, low voltage QRS precordial leads.  22: sinus rhythm, possible old anterior MI.  ------------------------------------------ CT: 10/27/22: CCTA: normal coronary arteries, LV EF 30%, LV dilated, RV dilated, dilated LA, dilated RA.   22: noncontrast chest: cardiomegaly, normal great vessels, bilobed anterior mediastinal density likely thymic tissue.   ------------------------------------------ Echo: 23: EF 29%, severely dilated LV, grade I diastolic dysfunction, mild-to-moderate MR.  22: EF 29%, moderately dilated LV, grade III diastolic dysfunction, moderately dilated LA, mild MR.   22: EF 27%, severely dilated LV, grade I diastolic dysfunction, mildly dilated LA, normal aortic root and proximal ascending aorta.   ------------------------------------------ MRI: 22: cardiac: dilated LV, LV EF 29%, normal RV size, RV EF 56%, normal great vessels, subendocardial basal and mid "lateral" wall, basal inferior wall, basal anteroseptum, and papillary muscles  scar consistent with EGPA

## 2023-12-19 NOTE — HISTORY OF PRESENT ILLNESS
[de-identified] : Chinese speaker, MADAN used: ID# [id number] "Bone obstruction" in her nose with constant difficulty breathing through her nose and frequent clear drg; no further discolored rhinorrhea but she now f/u a CT for polyposis. She's been taking Zyrtec and a Russian spray (which when transliterated ends in "-mettazoline") and can't breath without it. When she was a child she had "sinuses removed" in [place] and refuses any endoscopy as apparently this was a traumatic experience.  Has EGPA with a hx of nasal polyps and is being rx w/ Nucala by Dr. Multani and mtx.

## 2023-12-19 NOTE — HISTORY OF PRESENT ILLNESS
[FreeTextEntry1] : Ms. 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 She is from [place] (the [facility], [place] the state in the [place] and had an echocardiogram there in February, 2021 which revealed an EF of 60% and a mildly dilated LV (LVEDd 5.6 cm), an interatrial septal aneurysm, moderate MR, and a small pericardial effusion.  She has a maternal cousin that had an "enlarged" heart but no other family history of cardiomyopathy.  [place] in early [place].  She was diagnosed with COVID-19 on 05/06/22 and had moderate illness without requiring hospitalization. She has a history of asthma that was diagnosed at the age of 11.  In 2021 she had an episode of hemoptysis with peripheral eosinophilia and was subsequently diagnosed with EGPA. On 03/29/22, she had a CT chest which revealed cardiomegaly.   Although limited by lack of contrast, the great vessels appeared normal size. She is following with a rheumatologist, Carol Multani MD and was started on induction therapy with subcutaneous methotrexate (Rasuvo auto injector) and methylprednisone. She has BARKSDALE to 2-3 flights of stairs and denies chest pain.  On 09/06/22, she had an echocardiogram which revealed an EF of 27%, severely dilated LV, grade I diastolic dysfunction, mildly dilated LA, and a normal aortic root and proximal ascending aorta.  We had an extensive discussion about the newly diagnosed dilated cardiomyopathy which is likely nonischemic and secondary to EGPA.  On 09/20/22 she had a cardiac MRI which revealed a dilated LV, LV EF 29%, normal RV size, RV EF 56%, normal great vessels, subendocardial basal and mid "lateral" wall, basal inferior wall, basal anteroseptum, and papillary muscles consistent with EGPA. On 10/27/22, she had a CCTA, which revealed normal coronary arteries, LV EF 30%, LV dilated, RV dilated, dilated LA, and a dilated RA.  Compared to the CT chest performed on 03/29/22, there was the interval development of a bilobed anterior mediastinal density which appeared to represent thymic tissue.  She states, she had a biopsy of a similar sounding mass in 2020 back home in [place].  She was referred to a thoracic surgeon, Carlos Garrett MD, to address a thymic mass.  On 12/19/22, she had an echocardiogram which revealed an EF of 29%, moderately dilated LV, grade III diastolic dysfunction, moderately dilated LA, and mild MR. At present, she feels much improved.  She has lost about 20 pounds, her BARKSDALE is mild and stable, and she denies lower extremity edema. On 05/12/23, she had an echocardiogram which revealed an EF of 29%, severely dilated LV, grade I diastolic dysfunction, and mild-to-moderate MR.  At present, she is doing very well.  She has no edema and her BARKSDALE is better than it has been in a long while.  We discussed considering ICD implantation for primary prevention.

## 2023-12-20 LAB
ALBUMIN SERPL ELPH-MCNC: 4.8 G/DL
ALP BLD-CCNC: 75 U/L
ALT SERPL-CCNC: 13 U/L
ANION GAP SERPL CALC-SCNC: 11 MMOL/L
AST SERPL-CCNC: 19 U/L
BASOPHILS # BLD AUTO: 0.03 K/UL
BASOPHILS NFR BLD AUTO: 0.5 %
BILIRUB SERPL-MCNC: 0.5 MG/DL
BUN SERPL-MCNC: 12 MG/DL
CALCIUM SERPL-MCNC: 10.1 MG/DL
CHLORIDE SERPL-SCNC: 100 MMOL/L
CHOLEST SERPL-MCNC: 160 MG/DL
CO2 SERPL-SCNC: 26 MMOL/L
CREAT SERPL-MCNC: 0.68 MG/DL
EGFR: 128 ML/MIN/1.73M2
EOSINOPHIL # BLD AUTO: 0.11 K/UL
EOSINOPHIL NFR BLD AUTO: 1.9 %
ESTIMATED AVERAGE GLUCOSE: 100 MG/DL
GLUCOSE SERPL-MCNC: 93 MG/DL
HBA1C MFR BLD HPLC: 5.1 %
HCT VFR BLD CALC: 40.7 %
HDLC SERPL-MCNC: 66 MG/DL
HGB BLD-MCNC: 13.8 G/DL
IMM GRANULOCYTES NFR BLD AUTO: 0.4 %
LDLC SERPL DIRECT ASSAY-MCNC: 94 MG/DL
LYMPHOCYTES # BLD AUTO: 2.16 K/UL
LYMPHOCYTES NFR BLD AUTO: 38 %
MAN DIFF?: NORMAL
MCHC RBC-ENTMCNC: 32.2 PG
MCHC RBC-ENTMCNC: 33.9 GM/DL
MCV RBC AUTO: 94.9 FL
MONOCYTES # BLD AUTO: 0.34 K/UL
MONOCYTES NFR BLD AUTO: 6 %
NEUTROPHILS # BLD AUTO: 3.03 K/UL
NEUTROPHILS NFR BLD AUTO: 53.2 %
NT-PROBNP SERPL-MCNC: 798 PG/ML
PLATELET # BLD AUTO: 276 K/UL
POTASSIUM SERPL-SCNC: 4.8 MMOL/L
PROT SERPL-MCNC: 7.2 G/DL
RBC # BLD: 4.29 M/UL
RBC # FLD: 12.3 %
SODIUM SERPL-SCNC: 137 MMOL/L
TRIGL SERPL-MCNC: 47 MG/DL
TSH SERPL-ACNC: 1.99 UIU/ML
WBC # FLD AUTO: 5.69 K/UL

## 2024-01-11 ENCOUNTER — RX RENEWAL (OUTPATIENT)
Age: 19
End: 2024-01-11

## 2024-02-04 ENCOUNTER — NON-APPOINTMENT (OUTPATIENT)
Age: 19
End: 2024-02-04

## 2024-02-22 ENCOUNTER — APPOINTMENT (OUTPATIENT)
Dept: RHEUMATOLOGY | Facility: CLINIC | Age: 19
End: 2024-02-22

## 2024-02-23 ENCOUNTER — APPOINTMENT (OUTPATIENT)
Dept: RHEUMATOLOGY | Facility: CLINIC | Age: 19
End: 2024-02-23
Payer: MEDICAID

## 2024-02-23 ENCOUNTER — NON-APPOINTMENT (OUTPATIENT)
Age: 19
End: 2024-02-23

## 2024-02-23 ENCOUNTER — APPOINTMENT (OUTPATIENT)
Dept: HEART AND VASCULAR | Facility: CLINIC | Age: 19
End: 2024-02-23
Payer: MEDICAID

## 2024-02-23 VITALS
OXYGEN SATURATION: 100 % | DIASTOLIC BLOOD PRESSURE: 66 MMHG | TEMPERATURE: 98.3 F | BODY MASS INDEX: 27.66 KG/M2 | HEART RATE: 74 BPM | WEIGHT: 166 LBS | HEIGHT: 65 IN | SYSTOLIC BLOOD PRESSURE: 107 MMHG

## 2024-02-23 DIAGNOSIS — F41.9 ANXIETY DISORDER, UNSPECIFIED: ICD-10-CM

## 2024-02-23 DIAGNOSIS — E53.8 DEFICIENCY OF OTHER SPECIFIED B GROUP VITAMINS: ICD-10-CM

## 2024-02-23 DIAGNOSIS — R09.81 NASAL CONGESTION: ICD-10-CM

## 2024-02-23 PROCEDURE — 93306 TTE W/DOPPLER COMPLETE: CPT

## 2024-02-23 PROCEDURE — 93970 EXTREMITY STUDY: CPT

## 2024-02-23 PROCEDURE — 99214 OFFICE O/P EST MOD 30 MIN: CPT | Mod: 25

## 2024-02-25 PROBLEM — F41.9 ANXIETY: Status: ACTIVE | Noted: 2022-09-06

## 2024-02-25 LAB
ALBUMIN SERPL ELPH-MCNC: 4.5 G/DL
ALP BLD-CCNC: 41 U/L
ALT SERPL-CCNC: 22 U/L
ANION GAP SERPL CALC-SCNC: 15 MMOL/L
AST SERPL-CCNC: 19 U/L
BASOPHILS # BLD AUTO: 0.02 K/UL
BASOPHILS NFR BLD AUTO: 0.4 %
BILIRUB SERPL-MCNC: 1.1 MG/DL
BUN SERPL-MCNC: 8 MG/DL
CALCIUM SERPL-MCNC: 9.7 MG/DL
CHLORIDE SERPL-SCNC: 102 MMOL/L
CO2 SERPL-SCNC: 22 MMOL/L
CREAT SERPL-MCNC: 0.58 MG/DL
CRP SERPL-MCNC: <3 MG/L
EGFR: 133 ML/MIN/1.73M2
EOSINOPHIL # BLD AUTO: 0 K/UL
EOSINOPHIL NFR BLD AUTO: 0 %
ERYTHROCYTE [SEDIMENTATION RATE] IN BLOOD BY WESTERGREN METHOD: 2 MM/HR
GLUCOSE SERPL-MCNC: 89 MG/DL
HCT VFR BLD CALC: 36.3 %
HGB BLD-MCNC: 12.3 G/DL
IMM GRANULOCYTES NFR BLD AUTO: 0.2 %
LYMPHOCYTES # BLD AUTO: 1.74 K/UL
LYMPHOCYTES NFR BLD AUTO: 34.7 %
MAN DIFF?: NORMAL
MCHC RBC-ENTMCNC: 31.9 PG
MCHC RBC-ENTMCNC: 33.9 GM/DL
MCV RBC AUTO: 94.3 FL
MONOCYTES # BLD AUTO: 0.42 K/UL
MONOCYTES NFR BLD AUTO: 8.4 %
NEUTROPHILS # BLD AUTO: 2.82 K/UL
NEUTROPHILS NFR BLD AUTO: 56.3 %
PLATELET # BLD AUTO: 245 K/UL
POTASSIUM SERPL-SCNC: 4.8 MMOL/L
PROT SERPL-MCNC: 6.7 G/DL
RBC # BLD: 3.85 M/UL
RBC # FLD: 13.3 %
SODIUM SERPL-SCNC: 140 MMOL/L
VIT B12 SERPL-MCNC: 507 PG/ML
WBC # FLD AUTO: 5.01 K/UL

## 2024-02-25 NOTE — END OF VISIT
[Time Spent: ___ minutes] : I have spent [unfilled] minutes of time on the encounter. [FreeTextEntry3] : All medical record entries made by the Scribe were at my, Dr. Libby Multani MD, direction and personally dictated by me on 02/23/2024. I have reviewed the chart and agree that the record accurately reflects my personal performance of the history, physical exam, assessment and plan. I have also personally directed, reviewed, and agreed with the chart.

## 2024-02-25 NOTE — HISTORY OF PRESENT ILLNESS
[FreeTextEntry1] : February 23, 2024 Patient returns for follow up, completed echo prior to visit Was seen by cardiologist today as well Patient overall feeling well, feels better in terms of shortness of breath, chest pain, and exercise tolerance. +weight loss +nasal congestion, no epistaxis Patient feels increase in anxiety since mother's hospitalization for CVA Continues MTX 20 mg weeky, nucala 300 mg every four weeks Mother is on phone during as well Discussed medications for depression and anxiety as difficulty sleeping  Taking melatonin for sleep with minimal benefit  May 8, 2023 Patent returns for follow up Christina  #[id number] Feeling better Feels much better, only thing bothering her, if gets up to quickly, has some dizzy, lasts couple of seconds Since last visit with cardiologist, lowered metoprolol to 25 mg qday  Again discussed cytoxan with patient with the assistance of interprer  April 4, 2023 Patient was seen in cardiology as here for appointment today Patient here with her mother  surface offered, declined use of  at this time, although did use tranlsation service on patient's phone Reviewed treatment options with patient Discussed cytoxan treatments Discussed risks and benefits,  patient is very concerned about possible hairloss  December 23, 2022 Patient returns for follow up Patient feeling well, patient is here with her mother  service offered, patient declines use of  as did not feel translated for her properly, patient is also translating for her mother who speaks Christina Patient reports feeling well UNclear exercise tolerance, able to ambulate but does not perform household activities like laundry or shopping to assess exercise tolerance Some increase in nasal congestion over the past few weeks, feels related to cold weather Continues nucala 300 mg every four weeks Reviewed recent echo with decline in function with patient Reviewed recent labs  October 12, 2022 Patient returns for follow up   #[id number] Patient is having difficulty with Nucala, receiving from pharmacy. Patient has been receiving only two doses instead of three doses,  Last dose, September 8 took 300 mg, has taken one dose to date Only delivered 200 mg this month Patient felt some improvement from first dose, congestion improving. Continues Rasuvo weekly Reviewed labs from cardiologist CBC and chemistry in the normal range. Discussed recent results from cardiologist and heart failure specialist Discussed upcoming imaging scheduled at [facility] Discussed possible addition of more aggressive therapy if vasculitis noted Possible rituximab versus cyclophosphamide in addition to Nucala. Was started on Entresto by cardiologist, blood pressure on the lower side today, patient did not eat yet today Currently asymptomatic.  Per cardiology evaluation: October 3, 2022 EGPA with cardiac involvement as evidenced by severe LV dysfunction with cavity dilation and non-coronary subendocardial LGE on CMRI. On CT chest from March the heart was already enlarged. She seems to have no arrhythmia or conduction disease.  Has NYHA class II-III symptoms (unclear if all cardiac) and severely elevated pBNP. She appears euvolemic on exam. Cr, Na and Hgb are normal.  August 29, 2022 Patient returns for rheumatology follow-up [facility]  service used during visit with patient and mother Patient with increasing difficulty breathing through her nose due to severe congestion Patient currently treated with Rasuvo 20 mg once a week and folic acid once a day Previously treated with Medrol with tapering doses however patient could not tolerate side effects and is not interested in restarting at this time Patient was prescribed Nucala by Dr. Royal, awaiting delivery from specialty pharmacy No side effects from Rasuvo noted by patient Discussed RN visit for first dose of Nucala, patient feels mother able to inject as works as a nurse. Also with intermittent rashes and joint pains as well.  From Previous Rheumatology notes:  15 y/o F with previous diagnosed EGPA  presents to establish care.  She is recent immigrant from [facility]  at age 14-15, she developed recurrent sinusitis and pulm symptoms with SOB, that time, diagnosed with Asthma and started treated with nebs, symbicort, recurrent systemic steroid need, pt remained symptomatic and asthma was poorly controlled despite treatment.  On Nov 23rd 2019 developed arthritis and rash affecting arms and lower extremities,  diagnoses was unclear for months. Subsequently she was evaluated in [place] and told asthma.  02/22/21 CT chest with mediastinal LAD, GGO and parenchymal density,  small pericardial effusion as per report.  She subsequently found peripheral eosinophilia up to 70% as per pt Mom.  Had bone marrow biopsy and no lymphoproliferative disease found. At that time suspected EGPA and around May 2021 started treatment with high doses of Medrol and MTX titrated up to 20mg.  Steroid dose gradually tapered down. She is off treatment since November, 2021 and was waiting for insurance and run out meds.  Had COVID March 2021.  For past few weeks she has been experiencing intermittent skin rashes, arthralgia,  dyspnea on exertion, sinus congestion.  She gained weight since steroids.

## 2024-02-25 NOTE — REVIEW OF SYSTEMS
[Nasal Discharge] : nasal discharge [Anxiety] : anxiety [Depression] : depression [FreeTextEntry4] : nasal congestion

## 2024-02-25 NOTE — ASSESSMENT
[FreeTextEntry1] : 18-year-old woman, here with her mother, with diagnosis of EGPA diagnosed in in [place]. In 02/22/21, CT chest with mediastinal LAD, GGO and parenchymal density, small pericardial effusion as per report. Patient subsequently found peripheral eosinophilia up to 70%, had bone marrow biopsy and no lymphoproliferative disease found. At that time suspected EGPA and around May 2021 started treatment with high doses of Medrol and s/c MTX titrated up to 20 mg.  Patient was treated by Rheumatologist in [place] with good clinical response and remission as per pt. was off treatment from November 2021 to March 2022 at which time, restarted steroids and MTX 15 mg and increased to 20 mg. Patient unable to tolerate side effects of steroids, self tapered off, continues Rasuvo 20 mg weekly. Patient was started on Nucala 300 mg every 4 weeks in August 2022 as well. Patient also noted to have cardiomegaly on CT chest, follows closely with cardiology revealed severe LV dysfunction with cavity dilation and noncoronary subendocardial LGE on cardiac MRI. Has NYHA class II 3 symptoms and elevated BNP. Patient is currently euvolemic and asymptomatic. Previous cardiac MRI did not reveal coronary vasculitis, patient to CT angio of the coronaries with IV contrast without coronary vasculitis. 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 Patient with increasing anxiety symptoms, patient feels related to personal health issues as well as mother's health concerns as well, discussed trial of antidepressant/antianxiety, will refer for mental health evaluation and will discuss with cardiologist as well. Will update labs today in office, will check CBC, CMP, ESR, CRP, Vitamin B12, ANCA, and Vitamin D.  further management pending results.

## 2024-02-25 NOTE — ADDENDUM
[FreeTextEntry1] : I, Jarred Webber, documented this note as a scribe on behalf of Dr. Libby Multani MD on 02/23/2024.

## 2024-02-25 NOTE — DATA REVIEWED
[FreeTextEntry1] : CT Chest No Cont	(Report)		\par  EXAM: CT CHEST\par  \par  PROCEDURE DATE: 03/29/2022\par  \par  \par  \par  \par  INTERPRETATION: CT of the CHEST without intravenous contrast dated 3/29/2022 1:46 PM\par  \par  INDICATION: R91.8\par  \par  TECHNIQUE: CT of the chest was performed without intravenous contrast. Intravenous contrast was not used Axial and coronal and sagittal images were produced and reviewed. Additional imaging performed was axial supine in expiration and axial prone in inspiration.\par  \par  PRIOR STUDIES: No prior outside studies available for comparison.\par  \par  FINDINGS: The heart is enlarged. Trace inferior pericardial fluid. Evaluation of the vasculature is limited without intravenous contrast, but the great vessels are grossly unremarkable. Evaluation for adenopathy is limited without intravenous contrast. Within that limitation, no mediastinal or hilar or axillary lymphadenopathy is seen. Normal caliber of the main pulmonary artery.\par  \par  No pleural effusions are seen. Evaluation of the pulmonary parenchyma demonstrates no significant abnormality. The expiratory sequence is not been obtained in full expiration however no significant air trapping identified. No endobronchial lesion. No evidence of interstitial lung disease. No evidence of small or large airways disease. No lung mosaic attenuation. No groundglass nodules.\par  \par  Limited evaluation of the upper abdomen demonstrates no abnormality.\par  \par  Evaluation of the osseous structures demonstrates mild pectus carinatum.\par  \par  \par  IMPRESSION: No significant lung pathology.\par  Cardiomegaly. Recommend correlation with echo.\par  \par  \par  --- End of Report ---\par  \par  \par  \par  \par  \par  \par  JEFFREY MOORE MD; Attending Radiologist\par  This document has been electronically signed. Mar 30 2022 3:15PM\par  \par  \par   MR Cardiac Velocity Flow Map             Final\par  \par  No Documents Attached\par  \par  \par  Result Annotated 22Sep2022 04:06PM by SALLY VALENCIA\par  \par  \par  had an extesnive discussion with Caty (with ) and informed of significance of MR findings\par  \par  \par    EXAM: [id number] - MR CARD VELOCITY FLOW MAPPING  - ORDERED BY: SALLY VALENCIA\par  \par  EXAM: [id number] - MR CARD MORPH FUNCTION WAW IC  - ORDERED BY: SALLY VALENCIA\par  \par  \par  PROCEDURE DATE:  09/20/2022\par  \par  \par  \par  INTERPRETATION:  I. CLINICAL INFORMATION:  17 years old Female underwent a cardiac MRI for the evaluation of cardiomyopathy\par  \par  II. TECHNIQUE: Comprehensive Cardiac MRI examination was performed on a 1.5 T scanner using\par  standard cardiac coil, cardiac gating, and standard pulse sequences for the\par  assessment of cardiac morphology, function, and viability.  In addition, IV\par  gadolinium contrast was given for the evaluation of cardiac viability using standard delayed hyper-enhancement\par  protocol. Additional pulse sequence for flow quant analysis was performed.\par  \par  Ht: 167.64cm\par  Wt: 113.4kg\par  BSA: 2.199m2\par  \par  Contrast: 12 mL of Gadavist\par  \par  III. COMPARISON:  No images available for comparison.\par  \par  IV. FINDINGS:\par  \par  A. Cardiac morphology:\par  \par  1. Left ventricle\par  a. Global systolic function:  Reduced\par  b. Cavity size:  Dilated\par  c. Wall thickness:  Noncompaction of the apical segments.\par  \par  2. Right ventricle\par  a. Global systolic function: Normal\par  b. Cavity size:  Normal\par  \par  3. Aortic valve\par  a. Leaflets:  Normal\par  b. Aortic Regurgitation:  No significant\par  c. Aortic stenosis:  Absent\par  \par  4. Mitral valve\par  a. Leaflets:  Normal\par  b. Leaflet mobility:  Normal\par  c. Mitral regurgitation:  Mild\par  d. Mitral stenosis:  Absent\par  \par  5. Tricuspid valve\par  a. Leaflets:  Normal\par  b. Leaflet mobility:  Normal\par  c. Tricuspid regurgitation:  Mild\par  d. Tricuspid stenosis:  Absent\par  \par  6.Pulmonic valve\par  a. Leaflets:  Normal\par  b. PI:  No significant\par  c. PS:  Absent\par  \par  7. Left atrium\par  a. Size:  Dilated\par  b. Left atrial volume: 133 mL\par  c. Left atrial volume index (Biplane method): 60.5 ml/m2\par  \par  8. Right atrium\par  a. Size:  Normal\par  b. Right atrial area: 16 cm2\par  \par  9. Pericardium\par  a.Effusion:  None\par  \par  10. Aorta\par  No significant abnormalities in the visualized portions of the thoracic aorta\par  \par  11. Pulmonary artery\par  No significant abnormalities in the visualized portions of the pulmonary artery\par  \par  B. Ventricular volume measurements\par  \par  LVEF: 29%\par  LVEDV: 288.56mL\par  LVESV: 204.91mL\par  SV: 83.65mL\par  \par  LVEDVi: 131.22mL/m2\par  LVESVi: 93.18mL/m2\par  LVSVi: 38.04mL/m2\par  \par  RVEF: 56%\par  RVEDV: 132.09mL\par  RVESV: 57.96mL\par  SV: 74.13mL\par  \par  RVEDVi: 60.06mL/m2\par  RVESVi: 26.35mL/m2\par  RVSVi: 33.71mL/m2\par  \par  C.Myocardial wall motion: Global\par  \par  D.Delayed Enhancement: There is subendocardial enhancement of the basal to mid lateral wall and the basal anterior, basal inferior and basal anteroseptum.  Additionally there is enhancement of the papillary muscles.  Overall this pattern is non-ischemic.  This pattern of subendocardial enhancement in non-coronary distribution has been reported in EPGA.\par  \par  E.Q-flow analysis:\par  \par  Qp:\par  Stroke volume: 63.0\par  Forward flow: 64.3mL\par  Backward flow: 0.34mL\par  Regurgitant fraction: 0.53%\par  \par  Qs:\par  Stroke volume: 64.7mL\par  Forward flow: 65.5mL\par  Backward flow: 0.84mL\par  Regurgitant fraction: 1.28%\par  \par  Qp/Qs: 0.99\par  \par  V.\par  Impression:\par  1.  The left ventricle (LV) is dilated. There is  no evidence of LV hypertrophy . Left ventricular global systolic function is reduced. The LV ejection fraction is 29 %.\par  2.  The right ventricle (RV) is normal in size. RV global systolic function is normal. The RV ejection fraction is 56 %.\par  3.  No significant valvular abnormalities.\par  4.  No significant abnormalities of the visualized portions of the great vessels.\par  5.  There is subendocardial enhancement of the basal to mid lateral wall and the basal anterior, basal inferior and basal anteroseptum.  Additionally there is enhancement of the papillary muscles.  Overall this pattern is non-ischemic.  This pattern of subendocardial enhancement in non-coronary distribution has been reported in EPGA.\par  \par  The sequences used in this study were designed for imaging cardiac structures and are suboptimal for imaging other structures and organs.\par  \par  --- End of Report ---\par  \par  \par  \par  \par  \par  \par  ANDREIA VELA MD; Attending Cardiologist\par  This document has been electronically signed. Sep 21 2022  6:23PM\par  \par   \par  \par   Ordered by: SALLY VALENCIA       Collected/Examined: 20Sep2022 11:31AM       \par  Verified by: SALLY VALENCIA 22Sep2022 04:06PM       \par   Result Communication: Discussed results with patient;\par  Stage: Final       \par   Performed at: [facility]       Resulted: 21Sep2022 03:49PM       Last Updated: 22Sep2022 04:06PM       Accession: [id number]       \par  \par  \par   MR Cardiac w/wo IV Cont             Final\par  \par  No Documents Attached\par  \par  \par  Result Annotated 22Sep2022 04:06PM by SALLY VALENCIA\par  \par  \par  had an extesnive discussion with Caty (with ) and informed of significance of MR findings\par  \par  \par    EXAM: [id number] - MR CARD VELOCITY FLOW MAPPING  - ORDERED BY: SALLY VALENCIA\par  \par  EXAM: [id number] - MR CARD MORPH FUNCTION WAW IC  - ORDERED BY: SALLY VALENCIA\par  \par  \par  PROCEDURE DATE:  09/20/2022\par  \par  \par  \par  INTERPRETATION:  I. CLINICAL INFORMATION:  17 years old Female underwent a cardiac MRI for the evaluation of cardiomyopathy\par  \par  II. TECHNIQUE: Comprehensive Cardiac MRI examination was performed on a 1.5 T scanner using\par  standard cardiac coil, cardiac gating, and standard pulse sequences for the\par  assessment of cardiac morphology, function, and viability.  In addition, IV\par  gadolinium contrast was given for the evaluation of cardiac viability using standard delayed hyper-enhancement\par  protocol. Additional pulse sequence for flow quant analysis was performed.\par  \par  Ht: 167.64cm\par  Wt: 113.4kg\par  BSA: 2.199m2\par  \par  Contrast: 12 mL of Gadavist\par  \par  III. COMPARISON:  No images available for comparison.\par  \par  IV. FINDINGS:\par  \par  A. Cardiac morphology:\par  \par  1. Left ventricle\par  a. Global systolic function:  Reduced\par  b. Cavity size:  Dilated\par  c. Wall thickness:  Noncompaction of the apical segments.\par  \par  2. Right ventricle\par  a. Global systolic function: Normal\par  b. Cavity size:  Normal\par  \par  3. Aortic valve\par  a. Leaflets:  Normal\par  b. Aortic Regurgitation:  No significant\par  c. Aortic stenosis:  Absent\par  \par  4. Mitral valve\par  a. Leaflets:  Normal\par  b. Leaflet mobility:  Normal\par  c. Mitral regurgitation:  Mild\par  d. Mitral stenosis:  Absent\par  \par  5. Tricuspid valve\par  a. Leaflets:  Normal\par  b. Leaflet mobility:  Normal\par  c. Tricuspid regurgitation:  Mild\par  d. Tricuspid stenosis:  Absent\par  \par  6.Pulmonic valve\par  a. Leaflets:  Normal\par  b. PI:  No significant\par  c. PS:  Absent\par  \par  7. Left atrium\par  a. Size:  Dilated\par  b. Left atrial volume: 133 mL\par  c. Left atrial volume index (Biplane method): 60.5 ml/m2\par  \par  8. Right atrium\par  a. Size:  Normal\par  b. Right atrial area: 16 cm2\par  \par  9. Pericardium\par  a.Effusion:  None\par  \par  10. Aorta\par  No significant abnormalities in the visualized portions of the thoracic aorta\par  \par  11. Pulmonary artery\par  No significant abnormalities in the visualized portions of the pulmonary artery\par  \par  B. Ventricular volume measurements\par  \par  LVEF: 29%\par  LVEDV: 288.56mL\par  LVESV: 204.91mL\par  SV: 83.65mL\par  \par  LVEDVi: 131.22mL/m2\par  LVESVi: 93.18mL/m2\par  LVSVi: 38.04mL/m2\par  \par  RVEF: 56%\par  RVEDV: 132.09mL\par  RVESV: 57.96mL\par  SV: 74.13mL\par  \par  RVEDVi: 60.06mL/m2\par  RVESVi: 26.35mL/m2\par  RVSVi: 33.71mL/m2\par  \par  C.Myocardial wall motion: Global\par  \par  D.Delayed Enhancement: There is subendocardial enhancement of the basal to mid lateral wall and the basal anterior, basal inferior and basal anteroseptum.  Additionally there is enhancement of the papillary muscles.  Overall this pattern is non-ischemic.  This pattern of subendocardial enhancement in non-coronary distribution has been reported in EPGA.\par  \par  E.Q-flow analysis:\par  \par  Qp:\par  Stroke volume: 63.0\par  Forward flow: 64.3mL\par  Backward flow: 0.34mL\par  Regurgitant fraction: 0.53%\par  \par  Qs:\par  Stroke volume: 64.7mL\par  Forward flow: 65.5mL\par  Backward flow: 0.84mL\par  Regurgitant fraction: 1.28%\par  \par  Qp/Qs: 0.99\par  \par  V.\par  Impression:\par  1.  The left ventricle (LV) is dilated. There is  no evidence of LV hypertrophy . Left ventricular global systolic function is reduced. The LV ejection fraction is 29 %.\par  2.  The right ventricle (RV) is normal in size. RV global systolic function is normal. The RV ejection fraction is 56 %.\par  3.  No significant valvular abnormalities.\par  4.  No significant abnormalities of the visualized portions of the great vessels.\par  5.  There is subendocardial enhancement of the basal to mid lateral wall and the basal anterior, basal inferior and basal anteroseptum.  Additionally there is enhancement of the papillary muscles.  Overall this pattern is non-ischemic.  This pattern of subendocardial enhancement in non-coronary distribution has been reported in EPGA.\par  \par  The sequences used in this study were designed for imaging cardiac structures and are suboptimal for imaging other structures and organs.\par  \par  --- End of Report ---\par  \par  \par  \par  \par  \par  \par  ANDREIA VELA MD; Attending Cardiologist\par  This document has been electronically signed. Sep 21 2022  6:23PM\par  \par   \par  \par   Ordered by: SALLY VALENCIA       Collected/Examined: 20Sep2022 11:31AM       \par  Verified by: SALLY VALENCIA 22Sep2022 04:06PM       \par   Result Communication: Discussed results with patient;\par  Stage: Final       \par   Performed at: [facility]       Resulted: 21Sep2022 03:49PM       Last Updated: 22Sep2022 04:06PM       Accession: [id number]

## 2024-02-26 LAB
25(OH)D3 SERPL-MCNC: 20.7 NG/ML
IGE SER-MCNC: 345 KU/L

## 2024-03-04 ENCOUNTER — APPOINTMENT (OUTPATIENT)
Dept: OTOLARYNGOLOGY | Facility: CLINIC | Age: 19
End: 2024-03-04

## 2024-03-29 RX ORDER — METHOTREXATE 20 MG/.4ML
20 INJECTION, SOLUTION SUBCUTANEOUS
Qty: 1 | Refills: 5 | Status: ACTIVE | COMMUNITY
Start: 2022-04-21

## 2024-04-13 PROBLEM — E66.9 OBESITY: Status: ACTIVE | Noted: 2022-05-25

## 2024-04-15 ENCOUNTER — APPOINTMENT (OUTPATIENT)
Age: 19
End: 2024-04-15
Payer: COMMERCIAL

## 2024-04-15 ENCOUNTER — NON-APPOINTMENT (OUTPATIENT)
Age: 19
End: 2024-04-15

## 2024-04-15 VITALS
DIASTOLIC BLOOD PRESSURE: 70 MMHG | HEART RATE: 69 BPM | SYSTOLIC BLOOD PRESSURE: 120 MMHG | OXYGEN SATURATION: 96 % | BODY MASS INDEX: 29.16 KG/M2 | TEMPERATURE: 97.2 F | WEIGHT: 175 LBS | HEIGHT: 65 IN

## 2024-04-15 LAB
ALBUMIN SERPL ELPH-MCNC: 4.3 G/DL
ALP BLD-CCNC: 62 U/L
ALT SERPL-CCNC: 20 U/L
ANION GAP SERPL CALC-SCNC: 11 MMOL/L
ANION GAP SERPL CALC-SCNC: 13 MMOL/L
ANION GAP SERPL CALC-SCNC: 15 MMOL/L
AST SERPL-CCNC: 17 U/L
BASOPHILS # BLD AUTO: 0.04 K/UL
BASOPHILS NFR BLD AUTO: 0.6 %
BILIRUB SERPL-MCNC: 0.4 MG/DL
BUN SERPL-MCNC: 12 MG/DL
BUN SERPL-MCNC: 6 MG/DL
BUN SERPL-MCNC: 9 MG/DL
CALCIUM SERPL-MCNC: 9.7 MG/DL
CALCIUM SERPL-MCNC: 9.8 MG/DL
CALCIUM SERPL-MCNC: 9.9 MG/DL
CHLORIDE SERPL-SCNC: 102 MMOL/L
CHLORIDE SERPL-SCNC: 103 MMOL/L
CHLORIDE SERPL-SCNC: 104 MMOL/L
CO2 SERPL-SCNC: 22 MMOL/L
CO2 SERPL-SCNC: 24 MMOL/L
CO2 SERPL-SCNC: 25 MMOL/L
CREAT SERPL-MCNC: 0.62 MG/DL
CREAT SERPL-MCNC: 0.65 MG/DL
CREAT SERPL-MCNC: 0.73 MG/DL
EGFR: 121 ML/MIN/1.73M2
EGFR: 130 ML/MIN/1.73M2
EGFR: 131 ML/MIN/1.73M2
EOSINOPHIL # BLD AUTO: 0.15 K/UL
EOSINOPHIL NFR BLD AUTO: 2.3 %
GLUCOSE SERPL-MCNC: 93 MG/DL
GLUCOSE SERPL-MCNC: 94 MG/DL
GLUCOSE SERPL-MCNC: 97 MG/DL
HCT VFR BLD CALC: 42.1 %
HGB BLD-MCNC: 13.9 G/DL
IMM GRANULOCYTES NFR BLD AUTO: 0.3 %
LYMPHOCYTES # BLD AUTO: 1.92 K/UL
LYMPHOCYTES NFR BLD AUTO: 29.1 %
MAN DIFF?: NORMAL
MCHC RBC-ENTMCNC: 31.2 PG
MCHC RBC-ENTMCNC: 33 GM/DL
MCV RBC AUTO: 94.6 FL
MONOCYTES # BLD AUTO: 0.56 K/UL
MONOCYTES NFR BLD AUTO: 8.5 %
NEUTROPHILS # BLD AUTO: 3.9 K/UL
NEUTROPHILS NFR BLD AUTO: 59.2 %
NT-PROBNP SERPL-MCNC: 1953 PG/ML
NT-PROBNP SERPL-MCNC: 2294 PG/ML
PLATELET # BLD AUTO: 283 K/UL
POTASSIUM SERPL-SCNC: 4.7 MMOL/L
POTASSIUM SERPL-SCNC: 4.7 MMOL/L
POTASSIUM SERPL-SCNC: 5 MMOL/L
PROT SERPL-MCNC: 6.8 G/DL
RBC # BLD: 4.45 M/UL
RBC # FLD: 13.2 %
SODIUM SERPL-SCNC: 138 MMOL/L
SODIUM SERPL-SCNC: 140 MMOL/L
SODIUM SERPL-SCNC: 141 MMOL/L
WBC # FLD AUTO: 6.59 K/UL

## 2024-04-15 PROCEDURE — 99214 OFFICE O/P EST MOD 30 MIN: CPT

## 2024-04-15 PROCEDURE — G2211 COMPLEX E/M VISIT ADD ON: CPT

## 2024-04-15 RX ORDER — SACUBITRIL AND VALSARTAN 97; 103 MG/1; MG/1
97-103 TABLET, FILM COATED ORAL TWICE DAILY
Qty: 60 | Refills: 5 | Status: ACTIVE | COMMUNITY
Start: 2022-10-03 | End: 1900-01-01

## 2024-04-16 ENCOUNTER — APPOINTMENT (OUTPATIENT)
Dept: HEART AND VASCULAR | Facility: CLINIC | Age: 19
End: 2024-04-16

## 2024-04-16 DIAGNOSIS — E66.9 OBESITY, UNSPECIFIED: ICD-10-CM

## 2024-04-16 LAB
APTT BLD: 35.5 SEC
INR PPP: 0.87 RATIO
PT BLD: 9.9 SEC

## 2024-04-16 RX ORDER — FOLIC ACID 1 MG/1
1 TABLET ORAL
Qty: 30 | Refills: 6 | Status: ACTIVE | COMMUNITY
Start: 2022-03-14 | End: 1900-01-01

## 2024-04-16 RX ORDER — MEPOLIZUMAB 100 MG/ML
100 INJECTION, SOLUTION SUBCUTANEOUS
Qty: 3 | Refills: 5 | Status: ACTIVE | COMMUNITY
Start: 2022-10-12

## 2024-04-23 NOTE — ASSESSMENT
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 On CT chest from March 2022 the heart was already enlarged. She seems to have no arrhythmia or conduction disease.  Has NYHA II symptoms and is stable.  She had bruises in her legs likely related to scratching according to her. Her coags today are normal.  - Increase Entresto to  mg bid  - Toprol XL  25 mg - Continue close follow-up with rheumatology as immunosuppression is the cornerstone of treatment. Had an interruption in her Nucala treatment due to insurance problems. Hopes to re establish soon.   Jay Castro MD.

## 2024-04-23 NOTE — HISTORY OF PRESENT ILLNESS
[FreeTextEntry1] : Etiology of CMP: EGPA (Churg-Virgie) Date of last hospitalization: None here in [place] Date of last echocardiogram: 12/2022 LVEF/LVEDD: 29%/6.6 cm Type of ischemic work-up: CCTA Prior RHC: No  Diabetes: No Afib: No CKD: No ICD/CRT:  ACEi/ARB: No ARNI: Yes MRA: No BB: Yes SGLT2i: No Nitrates/Hydralazine: No  CardioMEMs: No In clinical trial: No  16 yo woman with a history of EGPA diagnosed 2021 in [place]. Also saw some doctors in [place] about it. Now followed by rheumatology here. She had a +ANCA reportedly in [place] but in later assessments it has been negative.  Treated with steroids in [place] and [place], improved her joint pains but had severe side effects like weight gain and mood changed. Currently was being treated with MTX (rasuvo) and more recently, after discovery of CMP, she was started on Nucala (Mepolizumab). Currently is being treated with Nucala every 4 weeks.  Her LVEF is ~25-30% assessed by TTE and CMRI. CMRI showed diffuse non-coronary distributed subendocardial LGE. RV looks ok. CCTA was performed and ruled out coronary vasculitis.  Since October 2022 she has been treated with ARNI and BB. Uptitration has been limited by severe dizziness. Currently tolerating moderate dose ARNI and low dose BB. Toprol was lowered from 50 to 25 due to severe orthostatic dizziness.  Symptoms of BARKSDALE, palpitations and CP have improved substantially. Last pBNP remained elevated ~1k.  She has also lost weight with diet and mild exercise. Denies orthopnea or PND.  Has never been on diuretics.  Although LV systolic function is grossly unchanged from December to May, diastology has improved from grade 3 to grade 1.  Over the last few months she has improved. She is working a full time job as a  now and is able to get to a whole day of work. She is also in better spirits, her English has improved as well.  She is tolerating Entresto moderate dose without dizziness and with normal BP.   ***EGPA hx summary from Rheumatology note:  She is recent immigrant from [place]  at age 14-15 she developed recurrent sinusitis and pulm symptoms with SOB, that times she was diagnosed with Asthma and started treated with nebs, symbicort, recurrent systemic steroid need, pt remained symptomatic and asthma was poorly controlled despite treatment.  On Nov 23rd 2019 developed arthritis and rash affecting arms and lower extremities, diagnoses was unclear for months. Subsequently she was evaluated in [place] and told asthma.  02/22/21 CT chest with mediastinal LAD, GGO and parenchymal density, small pericardial effusion as per report.  She subsequently found peripheral eosinophilia up to 70% as per pt Mom.  Had bone marrow biopsy and no lymphoproliferative disease found. At that time suspected EGPA and around May 2021 started treatment with high doses of Medrol and MTX titrated up to 20mg.  Steroid dose gradually tapered down. She is off treatment since November, 2021 and was waiting for insurance and run out meds.  Had COVID March 2021.

## 2024-04-23 NOTE — PHYSICAL EXAM
[Normal Venous Pressure] : normal venous pressure [Normal] : no edema, no cyanosis, no clubbing, no varicosities [No Edema] : no edema

## 2024-04-26 RX ORDER — METOPROLOL SUCCINATE 25 MG/1
25 TABLET, EXTENDED RELEASE ORAL
Qty: 90 | Refills: 3 | Status: ACTIVE | COMMUNITY
Start: 2022-09-06 | End: 1900-01-01

## 2024-04-30 RX ORDER — FUROSEMIDE 40 MG/1
40 TABLET ORAL DAILY
Qty: 90 | Refills: 3 | Status: ACTIVE | COMMUNITY
Start: 2024-04-30 | End: 1900-01-01

## 2024-05-01 PROBLEM — I50.22 CHRONIC SYSTOLIC HEART FAILURE: Status: ACTIVE | Noted: 2022-09-06

## 2024-05-02 ENCOUNTER — APPOINTMENT (OUTPATIENT)
Dept: HEART AND VASCULAR | Facility: CLINIC | Age: 19
End: 2024-05-02
Payer: MEDICAID

## 2024-05-02 VITALS
OXYGEN SATURATION: 99 % | SYSTOLIC BLOOD PRESSURE: 100 MMHG | DIASTOLIC BLOOD PRESSURE: 64 MMHG | WEIGHT: 175.5 LBS | HEART RATE: 88 BPM | BODY MASS INDEX: 29.24 KG/M2 | HEIGHT: 65 IN

## 2024-05-02 DIAGNOSIS — I50.22 CHRONIC SYSTOLIC (CONGESTIVE) HEART FAILURE: ICD-10-CM

## 2024-05-02 PROCEDURE — G2211 COMPLEX E/M VISIT ADD ON: CPT | Mod: NC,1L

## 2024-05-02 PROCEDURE — 99215 OFFICE O/P EST HI 40 MIN: CPT

## 2024-05-02 RX ORDER — ERGOCALCIFEROL (VITAMIN D2) 50 MCG
50 MCG CAPSULE ORAL DAILY
Qty: 90 | Refills: 3 | Status: ACTIVE | COMMUNITY
Start: 2024-05-02 | End: 1900-01-01

## 2024-05-02 NOTE — HISTORY OF PRESENT ILLNESS
[FreeTextEntry1] : Ms. 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 She is from [place]the [facility], [place] the state in the [place] and had an echocardiogram there in February, 2021 which revealed an EF of 60% and a mildly dilated LV (LVEDd 5.6 cm), an interatrial septal aneurysm, moderate MR, and a small pericardial effusion.  She has a maternal cousin that had an "enlarged" heart but no other family history of cardiomyopathy.  [place] the [place] in early [place].  She was diagnosed with COVID-19 on 05/06/22 and had moderate illness without requiring hospitalization. She has a history of asthma that was diagnosed at the age of 11.  In 2021 she had an episode of hemoptysis with peripheral eosinophilia and was subsequently diagnosed with EGPA. On 03/29/22, she had a CT chest which revealed cardiomegaly.   Although limited by lack of contrast, the great vessels appeared normal size. She is following with a rheumatologist, Carol Multani MD and was started on induction therapy with subcutaneous methotrexate (Rasuvo auto injector) and methylprednisone. She has BARKSDALE to 2-3 flights of stairs and denies chest pain.  On 09/06/22, she had an echocardiogram which revealed an EF of 27%, severely dilated LV, grade I diastolic dysfunction, mildly dilated LA, and a normal aortic root and proximal ascending aorta.  We had an extensive discussion about the newly diagnosed dilated cardiomyopathy which is likely nonischemic and secondary to EGPA.  On 09/20/22 she had a cardiac MRI which revealed a dilated LV, LV EF 29%, normal RV size, RV EF 56%, normal great vessels, subendocardial basal and mid "lateral" wall, basal inferior wall, basal anteroseptum, and papillary muscles consistent with EGPA. On 10/27/22, she had a CCTA, which revealed normal coronary arteries, LV EF 30%, LV dilated, RV dilated, dilated LA, and a dilated RA.  Compared to the CT chest performed on 03/29/22, there was the interval development of a bilobed anterior mediastinal density which appeared to represent thymic tissue.  She states, she had a biopsy of a similar sounding mass in 2020 back home in [place].  She was referred to a thoracic surgeon, Carlos Garrett MD, to address a thymic mass.  On 12/19/22, she had an echocardiogram which revealed an EF of 29%, moderately dilated LV, grade III diastolic dysfunction, moderately dilated LA, and mild MR. At present, she feels much improved.  She has lost about 20 pounds, her BARKSDALE is mild and stable, and she denies lower extremity edema. On 02/23/24, she had an echocardiogram which revealed an EF of 31%, severely dilated LV, grade II diastolic dysfunction, and mild MR.  We discussed considering ICD implantation for primary prevention.   On 04/30/24, she called to inform me of increased BARKSDALE and nasal congestion.  She was off Nucala for one month secondary to an inability to acquire the medication. She denies edema but has PND.  We discussed initiating prn loop diuretics with furosemide 40mg po qd prn.  She is now working as a .

## 2024-05-02 NOTE — ASSESSMENT
[FreeTextEntry1] : ======================================================================================= 1. Eosinophilic granulomatosis with polyangiitis:  - continue Nucala 300mg SC monthly (initiated 09/08/22)  - continue methotrexate SC (Rasuvo)  - follow up with rheumatologist, Libby Multani MD  - follow up with pulmonologist, Leah Tovar MD  2. Chronic systolic heart failure secondary to myocarditis and severe cardiomyopathy secondary to EGPA:  - follow up with heart failure expert, Jay Mckeon MD  - continue Entresto 97/103mg po bid  - continue metoprolol succinate 25mg tab po qd  - will follow with serial echocardiograms   - given extent of myocardial fibrosis noted on cardiac MR, significant LV recovery may be limited  - will discuss with heart failure expert indication for ICD for primary prevention of SCD  - continue furosemide 40mg po qd prn   3. Obesity: BMI: 29.2:   - we had an extensive discussion about therapeutic lifestyle changes to promote increased cardiovascular fitness and achieve goal weight   4. Thymic mass: ? prior biopsy [place] the [place] [facility] in 2020: deemed low risk:  - continue conservative management  5. Nasal polyposis: secondary to EGPA:  - follow up with ENT, Jose E Tolbert MD  I spent > 45 minutes of total time on this visit, including time spent face-to-face and non-face-to-face. During this time, I took a relevant history and examined the patient. I reviewed relevant portions of her medical record and formulated a differential diagnosis and plan. I explained the relevant cardiac diagnoses to the patient, as well as the work up and management plan. I answered all questions related to the patient's cardiac conditions.

## 2024-05-02 NOTE — REASON FOR VISIT
[FreeTextEntry1] : ======================================================================================= Diagnostic Tests: -------------------------------------------------------------- EC/15/24: sinus rhythm, low voltage QRS.  23: sinus rhythm, PRWP.   23: sinus rhythm, normal ECG.  22: sinus rhythm, low voltage QRS precordial leads.  22: sinus rhythm, possible old anterior MI.  -------------------------------------------------------------- CT: 10/27/22: CCTA: normal coronary arteries, LV EF 30%, LV dilated, RV dilated, dilated LA, dilated RA.   22: noncontrast chest: cardiomegaly, normal great vessels, bilobed anterior mediastinal density likely thymic tissue.   -------------------------------------------------------------- Echo: 24: EF 31%, severely dilated LV, grade II diastolic dysfunction, mild MR.  23: EF 29%, severely dilated LV, grade I diastolic dysfunction, mild-to-moderate MR.  22: EF 29%, moderately dilated LV, grade III diastolic dysfunction, moderately dilated LA, mild MR.   22: EF 27%, severely dilated LV, grade I diastolic dysfunction, mildly dilated LA, normal aortic root and proximal ascending aorta.   -------------------------------------------------------------- MRI: 22: cardiac: dilated LV, LV EF 29%, normal RV size, RV EF 56%, normal great vessels, subendocardial basal and mid "lateral" wall, basal inferior wall, basal anteroseptum, and papillary muscles  scar consistent with EGPA. -------------------------------------------------------------- Lower extremity veins: 24: sono: no DVT, no SVT, no reflux.

## 2024-05-28 ENCOUNTER — LABORATORY RESULT (OUTPATIENT)
Age: 19
End: 2024-05-28

## 2024-05-28 ENCOUNTER — APPOINTMENT (OUTPATIENT)
Dept: RHEUMATOLOGY | Facility: CLINIC | Age: 19
End: 2024-05-28

## 2024-05-28 VITALS
HEART RATE: 73 BPM | WEIGHT: 175 LBS | SYSTOLIC BLOOD PRESSURE: 130 MMHG | OXYGEN SATURATION: 100 % | TEMPERATURE: 98.1 F | BODY MASS INDEX: 29.16 KG/M2 | HEIGHT: 65 IN | DIASTOLIC BLOOD PRESSURE: 80 MMHG

## 2024-05-28 DIAGNOSIS — M30.1 POLYARTERITIS WITH LUNG INVOLVEMENT [CHURG-STRAUSS]: ICD-10-CM

## 2024-05-28 DIAGNOSIS — D72.18 POLYARTERITIS WITH LUNG INVOLVEMENT [CHURG-STRAUSS]: ICD-10-CM

## 2024-05-28 PROCEDURE — G2211 COMPLEX E/M VISIT ADD ON: CPT | Mod: NC,1L

## 2024-05-28 PROCEDURE — 99215 OFFICE O/P EST HI 40 MIN: CPT

## 2024-05-28 NOTE — HISTORY OF PRESENT ILLNESS
[FreeTextEntry1] : Vale Dubois [id number]  12/2019 came back with [place] controlled asthma 2/2020 with sob low blood pressure, LE weakness , Oxygen was 63% RA  CT chest showed pulmonary changes  Was evaluated Allergy Dr Wells and suggested diagnosed EGPA then evaluated by Dr Pierre  was evaluated had Bone Marrow biopsy was negative for malignancy ,  Injectable Methotrexate  and steroids, 2020  improved pulmonary symptoms and rash , sob improved, came off oxygen ,  Moved 12/2021  and then Followed by Dr Royal   Continued Methotrexate , off oral steroids, was evaluated with TTE decreased EF , Pulmnary Daniel Verdin

## 2024-05-29 LAB
ALBUMIN SERPL ELPH-MCNC: 5.3 G/DL
ALP BLD-CCNC: 52 U/L
ALT SERPL-CCNC: 14 U/L
ANION GAP SERPL CALC-SCNC: 14 MMOL/L
APPEARANCE: CLEAR
AST SERPL-CCNC: 18 U/L
BILIRUB SERPL-MCNC: 0.6 MG/DL
BILIRUBIN URINE: NEGATIVE
BLOOD URINE: NEGATIVE
BUN SERPL-MCNC: 11 MG/DL
C3 SERPL-MCNC: 110 MG/DL
C4 SERPL-MCNC: 28 MG/DL
CALCIUM SERPL-MCNC: 10.2 MG/DL
CHLORIDE SERPL-SCNC: 101 MMOL/L
CK SERPL-CCNC: 153 U/L
CO2 SERPL-SCNC: 23 MMOL/L
COLOR: YELLOW
CREAT SERPL-MCNC: 0.68 MG/DL
CREAT SPEC-SCNC: 138 MG/DL
CREAT/PROT UR: 0.1 RATIO
CRP SERPL-MCNC: <3 MG/L
DEPRECATED KAPPA LC FREE/LAMBDA SER: 1.19 RATIO
EGFR: 128 ML/MIN/1.73M2
ERYTHROCYTE [SEDIMENTATION RATE] IN BLOOD BY WESTERGREN METHOD: 5 MM/HR
GLUCOSE QUALITATIVE U: NEGATIVE MG/DL
GLUCOSE SERPL-MCNC: 85 MG/DL
HBV CORE IGM SER QL: NONREACTIVE
HBV SURFACE AB SERPL IA-ACNC: <3 MIU/ML
HBV SURFACE AG SER QL: NONREACTIVE
HCT VFR BLD CALC: 46.9 %
HCV AB SER QL: NONREACTIVE
HCV S/CO RATIO: 0.08 S/CO
HGB BLD-MCNC: 15.6 G/DL
IGA SER QL IEP: 181 MG/DL
IGE SER-MCNC: 554 KU/L
IGG SER QL IEP: 1086 MG/DL
IGG SER QL IEP: 1142 MG/DL
IGG1 SER-MCNC: 639 MG/DL
IGG2 SER-MCNC: 325 MG/DL
IGG3 SER-MCNC: 77.6 MG/DL
IGG4 SER-MCNC: 45 MG/DL
IGM SER QL IEP: 166 MG/DL
IRON SATN MFR SERPL: 28 %
IRON SERPL-MCNC: 98 UG/DL
KAPPA LC CSF-MCNC: 1.26 MG/DL
KAPPA LC SERPL-MCNC: 1.5 MG/DL
KETONES URINE: NEGATIVE MG/DL
LEUKOCYTE ESTERASE URINE: ABNORMAL
MCHC RBC-ENTMCNC: 31.6 PG
MCHC RBC-ENTMCNC: 33.3 GM/DL
MCV RBC AUTO: 95.1 FL
NITRITE URINE: NEGATIVE
PH URINE: 5.5
PLATELET # BLD AUTO: 290 K/UL
POTASSIUM SERPL-SCNC: 4.1 MMOL/L
PROT SERPL-MCNC: 7.9 G/DL
PROT UR-MCNC: 8 MG/DL
PROTEIN URINE: NEGATIVE MG/DL
RBC # BLD: 4.93 M/UL
RBC # FLD: 12.8 %
RHEUMATOID FACT SER QL: <10 IU/ML
SODIUM SERPL-SCNC: 138 MMOL/L
SPECIFIC GRAVITY URINE: 1.02
THYROGLOB AB SERPL-ACNC: <20 IU/ML
THYROPEROXIDASE AB SERPL IA-ACNC: <10 IU/ML
TIBC SERPL-MCNC: 349 UG/DL
UIBC SERPL-MCNC: 251 UG/DL
UROBILINOGEN URINE: 0.2 MG/DL
WBC # FLD AUTO: 6.43 K/UL

## 2024-05-30 LAB
ACE BLD-CCNC: 63 U/L
B2 GLYCOPROT1 IGA SERPL IA-ACNC: <5 SAU
B2 GLYCOPROT1 IGG SER-ACNC: <5 SGU
B2 GLYCOPROT1 IGM SER-ACNC: <5 SMU
CARDIOLIPIN AB SER IA-ACNC: NEGATIVE
CARDIOLIPIN IGM SER-MCNC: 6.2 MPL
CARDIOLIPIN IGM SER-MCNC: <5 GPL
CCP AB SER IA-ACNC: <8 UNITS
DSDNA AB SER-ACNC: <1 IU/ML
ENA RNP AB SER IA-ACNC: <0.2 AL
ENA SCL70 IGG SER IA-ACNC: <0.2 AL
ENA SM AB SER IA-ACNC: <0.2 AL
ENA SS-A AB SER IA-ACNC: <0.2 AL
ENA SS-B AB SER IA-ACNC: <0.2 AL
M TB IFN-G BLD-IMP: NEGATIVE
QUANTIFERON TB PLUS MITOGEN MINUS NIL: >10 IU/ML
QUANTIFERON TB PLUS NIL: 0.02 IU/ML
QUANTIFERON TB PLUS TB1 MINUS NIL: 0 IU/ML
QUANTIFERON TB PLUS TB2 MINUS NIL: 0 IU/ML
RF+CCP IGG SER-IMP: NEGATIVE

## 2024-05-31 LAB
ANA SER IF-ACNC: NEGATIVE
RNA POLYMERASE III IGG: 8 UNITS

## 2024-06-01 LAB
ALBUMIN MFR SERPL ELPH: 60 %
ALBUMIN SERPL-MCNC: 4.7 G/DL
ALBUMIN/GLOB SERPL: 1.5 RATIO
ALPHA1 GLOB MFR SERPL ELPH: 4.2 %
ALPHA1 GLOB SERPL ELPH-MCNC: 0.3 G/DL
ALPHA2 GLOB MFR SERPL ELPH: 9.2 %
ALPHA2 GLOB SERPL ELPH-MCNC: 0.7 G/DL
B-GLOBULIN MFR SERPL ELPH: 11.3 %
B-GLOBULIN SERPL ELPH-MCNC: 0.9 G/DL
GAMMA GLOB FLD ELPH-MCNC: 1.2 G/DL
GAMMA GLOB MFR SERPL ELPH: 15.3 %
INTERPRETATION SERPL IEP-IMP: NORMAL
M PROTEIN SPEC IFE-MCNC: NORMAL
PROT SERPL-MCNC: 7.9 G/DL
PROT SERPL-MCNC: 7.9 G/DL

## 2024-07-02 ENCOUNTER — APPOINTMENT (OUTPATIENT)
Dept: RHEUMATOLOGY | Facility: CLINIC | Age: 19
End: 2024-07-02
Payer: MEDICAID

## 2024-07-02 VITALS
TEMPERATURE: 98.2 F | DIASTOLIC BLOOD PRESSURE: 83 MMHG | HEART RATE: 82 BPM | SYSTOLIC BLOOD PRESSURE: 120 MMHG | WEIGHT: 182 LBS | HEIGHT: 65 IN | OXYGEN SATURATION: 100 % | BODY MASS INDEX: 30.32 KG/M2

## 2024-07-02 DIAGNOSIS — I77.82 ANTINEUTROPHILIC CYTOPLASMIC ANTIBODY [ANCA] VASCULITIS: ICD-10-CM

## 2024-07-02 PROCEDURE — G2211 COMPLEX E/M VISIT ADD ON: CPT | Mod: NC,1L

## 2024-07-02 PROCEDURE — 99215 OFFICE O/P EST HI 40 MIN: CPT

## 2024-07-08 PROBLEM — Z86.39 HISTORY OF VITAMIN D DEFICIENCY: Status: RESOLVED | Noted: 2022-06-14 | Resolved: 2024-07-08

## 2024-07-08 PROBLEM — E53.8 VITAMIN B12 DEFICIENCY: Status: RESOLVED | Noted: 2022-03-14 | Resolved: 2024-07-08

## 2024-07-08 PROBLEM — Z87.898 HISTORY OF NASAL CONGESTION: Status: RESOLVED | Noted: 2023-06-27 | Resolved: 2024-07-08

## 2024-07-08 PROBLEM — M79.606 LEG PAIN: Status: RESOLVED | Noted: 2023-12-19 | Resolved: 2024-07-08

## 2024-07-15 ENCOUNTER — APPOINTMENT (OUTPATIENT)
Dept: HEART AND VASCULAR | Facility: CLINIC | Age: 19
End: 2024-07-15

## 2024-07-15 ENCOUNTER — APPOINTMENT (OUTPATIENT)
Dept: HEART FAILURE | Facility: CLINIC | Age: 19
End: 2024-07-15

## 2024-07-15 VITALS
OXYGEN SATURATION: 100 % | WEIGHT: 191 LBS | HEIGHT: 65 IN | BODY MASS INDEX: 31.82 KG/M2 | HEART RATE: 81 BPM | TEMPERATURE: 98.1 F | SYSTOLIC BLOOD PRESSURE: 107 MMHG | DIASTOLIC BLOOD PRESSURE: 68 MMHG

## 2024-07-15 DIAGNOSIS — I50.22 CHRONIC SYSTOLIC (CONGESTIVE) HEART FAILURE: ICD-10-CM

## 2024-07-15 DIAGNOSIS — E53.8 DEFICIENCY OF OTHER SPECIFIED B GROUP VITAMINS: ICD-10-CM

## 2024-07-15 DIAGNOSIS — M30.1 POLYARTERITIS WITH LUNG INVOLVEMENT [CHURG-STRAUSS]: ICD-10-CM

## 2024-07-15 DIAGNOSIS — M79.606 PAIN IN LEG, UNSPECIFIED: ICD-10-CM

## 2024-07-15 DIAGNOSIS — D72.18 POLYARTERITIS WITH LUNG INVOLVEMENT [CHURG-STRAUSS]: ICD-10-CM

## 2024-07-15 DIAGNOSIS — Z86.39 PERSONAL HISTORY OF OTHER ENDOCRINE, NUTRITIONAL AND METABOLIC DISEASE: ICD-10-CM

## 2024-07-15 DIAGNOSIS — Z87.898 PERSONAL HISTORY OF OTHER SPECIFIED CONDITIONS: ICD-10-CM

## 2024-07-15 DIAGNOSIS — E66.9 OBESITY, UNSPECIFIED: ICD-10-CM

## 2024-07-15 PROCEDURE — ZZZZZ: CPT

## 2024-07-15 PROCEDURE — G2211 COMPLEX E/M VISIT ADD ON: CPT | Mod: NC,1L

## 2024-07-15 PROCEDURE — 99214 OFFICE O/P EST MOD 30 MIN: CPT

## 2024-08-06 ENCOUNTER — LABORATORY RESULT (OUTPATIENT)
Age: 19
End: 2024-08-06

## 2024-08-06 ENCOUNTER — APPOINTMENT (OUTPATIENT)
Dept: RHEUMATOLOGY | Facility: CLINIC | Age: 19
End: 2024-08-06

## 2024-08-06 PROCEDURE — G2211 COMPLEX E/M VISIT ADD ON: CPT | Mod: NC,1L

## 2024-08-06 PROCEDURE — 99215 OFFICE O/P EST HI 40 MIN: CPT

## 2024-09-05 ENCOUNTER — RX RENEWAL (OUTPATIENT)
Age: 19
End: 2024-09-05

## 2024-09-16 ENCOUNTER — APPOINTMENT (OUTPATIENT)
Dept: HEART FAILURE | Facility: CLINIC | Age: 19
End: 2024-09-16

## 2024-09-24 ENCOUNTER — APPOINTMENT (OUTPATIENT)
Dept: RHEUMATOLOGY | Facility: CLINIC | Age: 19
End: 2024-09-24

## 2024-10-08 ENCOUNTER — APPOINTMENT (OUTPATIENT)
Dept: RHEUMATOLOGY | Facility: CLINIC | Age: 19
End: 2024-10-08
Payer: COMMERCIAL

## 2024-10-08 VITALS
WEIGHT: 188 LBS | OXYGEN SATURATION: 100 % | DIASTOLIC BLOOD PRESSURE: 68 MMHG | HEART RATE: 80 BPM | BODY MASS INDEX: 31.32 KG/M2 | SYSTOLIC BLOOD PRESSURE: 105 MMHG | TEMPERATURE: 98.1 F | HEIGHT: 65 IN

## 2024-10-08 PROCEDURE — G2211 COMPLEX E/M VISIT ADD ON: CPT | Mod: NC

## 2024-10-08 PROCEDURE — 99215 OFFICE O/P EST HI 40 MIN: CPT

## 2024-10-29 ENCOUNTER — APPOINTMENT (OUTPATIENT)
Dept: RHEUMATOLOGY | Facility: CLINIC | Age: 19
End: 2024-10-29

## 2024-10-29 ENCOUNTER — NON-APPOINTMENT (OUTPATIENT)
Age: 19
End: 2024-10-29

## 2024-10-29 ENCOUNTER — APPOINTMENT (OUTPATIENT)
Dept: HEART FAILURE | Facility: CLINIC | Age: 19
End: 2024-10-29
Payer: COMMERCIAL

## 2024-10-29 VITALS
SYSTOLIC BLOOD PRESSURE: 92 MMHG | WEIGHT: 193 LBS | TEMPERATURE: 97.2 F | OXYGEN SATURATION: 99 % | BODY MASS INDEX: 32.15 KG/M2 | HEIGHT: 65 IN | HEART RATE: 62 BPM | DIASTOLIC BLOOD PRESSURE: 57 MMHG

## 2024-10-29 VITALS
WEIGHT: 188 LBS | HEIGHT: 65 IN | DIASTOLIC BLOOD PRESSURE: 67 MMHG | OXYGEN SATURATION: 100 % | SYSTOLIC BLOOD PRESSURE: 105 MMHG | HEART RATE: 89 BPM | BODY MASS INDEX: 31.32 KG/M2

## 2024-10-29 DIAGNOSIS — I50.20 UNSPECIFIED SYSTOLIC (CONGESTIVE) HEART FAILURE: ICD-10-CM

## 2024-10-29 PROCEDURE — G2211 COMPLEX E/M VISIT ADD ON: CPT | Mod: NC

## 2024-10-29 PROCEDURE — 99214 OFFICE O/P EST MOD 30 MIN: CPT

## 2024-10-29 PROCEDURE — 99215 OFFICE O/P EST HI 40 MIN: CPT

## 2024-11-01 LAB
ALBUMIN SERPL ELPH-MCNC: 4.5 G/DL
ALP BLD-CCNC: 41 U/L
ALT SERPL-CCNC: 9 U/L
ANION GAP SERPL CALC-SCNC: 11 MMOL/L
AST SERPL-CCNC: 18 U/L
BILIRUB SERPL-MCNC: 0.9 MG/DL
BUN SERPL-MCNC: 13 MG/DL
CALCIUM SERPL-MCNC: 9.5 MG/DL
CHLORIDE SERPL-SCNC: 104 MMOL/L
CO2 SERPL-SCNC: 26 MMOL/L
CREAT SERPL-MCNC: 0.81 MG/DL
EGFR: 106 ML/MIN/1.73M2
GLUCOSE SERPL-MCNC: 103 MG/DL
NT-PROBNP SERPL-MCNC: 584 PG/ML
POTASSIUM SERPL-SCNC: 4.8 MMOL/L
PROT SERPL-MCNC: 6.5 G/DL
SODIUM SERPL-SCNC: 141 MMOL/L

## 2024-12-11 ENCOUNTER — OUTPATIENT (OUTPATIENT)
Dept: OUTPATIENT SERVICES | Facility: HOSPITAL | Age: 19
LOS: 1 days | End: 2024-12-11
Payer: COMMERCIAL

## 2024-12-11 ENCOUNTER — APPOINTMENT (OUTPATIENT)
Dept: MRI IMAGING | Facility: HOSPITAL | Age: 19
End: 2024-12-11

## 2024-12-11 PROCEDURE — 75561 CARDIAC MRI FOR MORPH W/DYE: CPT

## 2024-12-11 PROCEDURE — 75561 CARDIAC MRI FOR MORPH W/DYE: CPT | Mod: 26

## 2024-12-11 PROCEDURE — A9577: CPT

## 2024-12-16 ENCOUNTER — APPOINTMENT (OUTPATIENT)
Age: 19
End: 2024-12-16
Payer: COMMERCIAL

## 2024-12-16 VITALS
HEIGHT: 65 IN | SYSTOLIC BLOOD PRESSURE: 92 MMHG | DIASTOLIC BLOOD PRESSURE: 65 MMHG | HEART RATE: 75 BPM | WEIGHT: 200 LBS | TEMPERATURE: 97.8 F | OXYGEN SATURATION: 100 % | BODY MASS INDEX: 33.32 KG/M2

## 2024-12-16 PROCEDURE — G2211 COMPLEX E/M VISIT ADD ON: CPT | Mod: NC

## 2024-12-16 PROCEDURE — 99214 OFFICE O/P EST MOD 30 MIN: CPT

## 2024-12-24 ENCOUNTER — APPOINTMENT (OUTPATIENT)
Dept: RHEUMATOLOGY | Facility: CLINIC | Age: 19
End: 2024-12-24
Payer: COMMERCIAL

## 2024-12-24 VITALS
HEART RATE: 87 BPM | TEMPERATURE: 97.9 F | DIASTOLIC BLOOD PRESSURE: 66 MMHG | OXYGEN SATURATION: 98 % | SYSTOLIC BLOOD PRESSURE: 99 MMHG

## 2024-12-24 DIAGNOSIS — D72.18 POLYARTERITIS WITH LUNG INVOLVEMENT [CHURG-STRAUSS]: ICD-10-CM

## 2024-12-24 DIAGNOSIS — M30.1 POLYARTERITIS WITH LUNG INVOLVEMENT [CHURG-STRAUSS]: ICD-10-CM

## 2024-12-24 DIAGNOSIS — E55.9 VITAMIN D DEFICIENCY, UNSPECIFIED: ICD-10-CM

## 2024-12-24 PROCEDURE — G2211 COMPLEX E/M VISIT ADD ON: CPT | Mod: NC

## 2024-12-24 PROCEDURE — 99215 OFFICE O/P EST HI 40 MIN: CPT

## 2024-12-25 ENCOUNTER — LABORATORY RESULT (OUTPATIENT)
Age: 19
End: 2024-12-25

## 2024-12-25 LAB
25(OH)D3 SERPL-MCNC: 16.9 NG/ML
ALBUMIN SERPL ELPH-MCNC: 4.5 G/DL
ALP BLD-CCNC: 46 U/L
ALT SERPL-CCNC: 9 U/L
ANION GAP SERPL CALC-SCNC: 11 MMOL/L
AST SERPL-CCNC: 14 U/L
BILIRUB SERPL-MCNC: 0.6 MG/DL
BUN SERPL-MCNC: 9 MG/DL
C3 SERPL-MCNC: 108 MG/DL
C4 SERPL-MCNC: 23 MG/DL
CALCIUM SERPL-MCNC: 9.6 MG/DL
CHLORIDE SERPL-SCNC: 101 MMOL/L
CO2 SERPL-SCNC: 25 MMOL/L
CREAT SERPL-MCNC: 0.64 MG/DL
CRP SERPL-MCNC: <3 MG/L
EGFR: 129 ML/MIN/1.73M2
ERYTHROCYTE [SEDIMENTATION RATE] IN BLOOD BY WESTERGREN METHOD: 6 MM/HR
FOLATE SERPL-MCNC: >20 NG/ML
GLUCOSE SERPL-MCNC: 93 MG/DL
IRON SATN MFR SERPL: 35 %
IRON SERPL-MCNC: 100 UG/DL
NT-PROBNP SERPL-MCNC: 779 PG/ML
POTASSIUM SERPL-SCNC: 4.5 MMOL/L
PROT SERPL-MCNC: 7.1 G/DL
SODIUM SERPL-SCNC: 138 MMOL/L
TIBC SERPL-MCNC: 284 UG/DL
UIBC SERPL-MCNC: 184 UG/DL
VIT B12 SERPL-MCNC: 517 PG/ML

## 2024-12-26 RX ORDER — ERGOCALCIFEROL 1.25 MG/1
1.25 MG CAPSULE, LIQUID FILLED ORAL
Qty: 5 | Refills: 3 | Status: ACTIVE | COMMUNITY
Start: 2024-12-26 | End: 1900-01-01

## 2025-01-28 ENCOUNTER — APPOINTMENT (OUTPATIENT)
Dept: OTOLARYNGOLOGY | Facility: CLINIC | Age: 20
End: 2025-01-28
Payer: COMMERCIAL

## 2025-01-28 ENCOUNTER — NON-APPOINTMENT (OUTPATIENT)
Age: 20
End: 2025-01-28

## 2025-01-28 VITALS
BODY MASS INDEX: 33.32 KG/M2 | DIASTOLIC BLOOD PRESSURE: 63 MMHG | HEART RATE: 65 BPM | WEIGHT: 200 LBS | TEMPERATURE: 98.2 F | OXYGEN SATURATION: 98 % | HEIGHT: 65 IN | SYSTOLIC BLOOD PRESSURE: 95 MMHG

## 2025-01-28 DIAGNOSIS — I77.82 ANTINEUTROPHILIC CYTOPLASMIC ANTIBODY [ANCA] VASCULITIS: ICD-10-CM

## 2025-01-28 DIAGNOSIS — J31.0 CHRONIC RHINITIS: ICD-10-CM

## 2025-01-28 DIAGNOSIS — J33.9 NASAL POLYP, UNSPECIFIED: ICD-10-CM

## 2025-01-28 DIAGNOSIS — T48.5X5A CHRONIC RHINITIS: ICD-10-CM

## 2025-01-28 DIAGNOSIS — J34.2 DEVIATED NASAL SEPTUM: ICD-10-CM

## 2025-01-28 PROCEDURE — 99214 OFFICE O/P EST MOD 30 MIN: CPT | Mod: 25

## 2025-01-28 PROCEDURE — 31231 NASAL ENDOSCOPY DX: CPT | Mod: 52

## 2025-01-28 RX ORDER — FLUTICASONE PROPIONATE 93 UG/1
93 SPRAY, METERED NASAL
Qty: 1 | Refills: 2 | Status: ACTIVE | COMMUNITY
Start: 2025-01-28 | End: 1900-01-01

## 2025-01-28 RX ORDER — DIAZEPAM 5 MG/1
5 TABLET ORAL
Qty: 2 | Refills: 0 | Status: ACTIVE | COMMUNITY
Start: 2025-01-28 | End: 1900-01-01

## 2025-02-05 ENCOUNTER — RX RENEWAL (OUTPATIENT)
Age: 20
End: 2025-02-05

## 2025-02-18 ENCOUNTER — APPOINTMENT (OUTPATIENT)
Dept: PULMONOLOGY | Facility: CLINIC | Age: 20
End: 2025-02-18

## 2025-02-21 ENCOUNTER — NON-APPOINTMENT (OUTPATIENT)
Age: 20
End: 2025-02-21

## 2025-03-18 ENCOUNTER — APPOINTMENT (OUTPATIENT)
Dept: RHEUMATOLOGY | Facility: CLINIC | Age: 20
End: 2025-03-18

## 2025-03-19 ENCOUNTER — RX RENEWAL (OUTPATIENT)
Age: 20
End: 2025-03-19

## 2025-03-25 ENCOUNTER — LABORATORY RESULT (OUTPATIENT)
Age: 20
End: 2025-03-25

## 2025-03-25 ENCOUNTER — APPOINTMENT (OUTPATIENT)
Dept: RHEUMATOLOGY | Facility: CLINIC | Age: 20
End: 2025-03-25

## 2025-03-25 VITALS
SYSTOLIC BLOOD PRESSURE: 109 MMHG | TEMPERATURE: 97.8 F | WEIGHT: 189 LBS | HEART RATE: 75 BPM | DIASTOLIC BLOOD PRESSURE: 71 MMHG | HEIGHT: 65 IN | OXYGEN SATURATION: 98 % | BODY MASS INDEX: 31.49 KG/M2

## 2025-03-25 DIAGNOSIS — J31.0 CHRONIC RHINITIS: ICD-10-CM

## 2025-03-25 DIAGNOSIS — T48.5X5A CHRONIC RHINITIS: ICD-10-CM

## 2025-03-25 DIAGNOSIS — M30.1 POLYARTERITIS WITH LUNG INVOLVEMENT [CHURG-STRAUSS]: ICD-10-CM

## 2025-03-25 DIAGNOSIS — I50.22 CHRONIC SYSTOLIC (CONGESTIVE) HEART FAILURE: ICD-10-CM

## 2025-03-25 DIAGNOSIS — I77.82 ANTINEUTROPHILIC CYTOPLASMIC ANTIBODY [ANCA] VASCULITIS: ICD-10-CM

## 2025-03-25 DIAGNOSIS — D72.18 POLYARTERITIS WITH LUNG INVOLVEMENT [CHURG-STRAUSS]: ICD-10-CM

## 2025-03-25 PROCEDURE — G2211 COMPLEX E/M VISIT ADD ON: CPT | Mod: NC

## 2025-03-25 PROCEDURE — 99215 OFFICE O/P EST HI 40 MIN: CPT

## 2025-03-26 LAB
ALBUMIN SERPL ELPH-MCNC: 4.7 G/DL
ALP BLD-CCNC: 48 U/L
ALT SERPL-CCNC: 12 U/L
ANION GAP SERPL CALC-SCNC: 12 MMOL/L
AST SERPL-CCNC: 20 U/L
BILIRUB SERPL-MCNC: 0.6 MG/DL
BUN SERPL-MCNC: 8 MG/DL
C3 SERPL-MCNC: 98 MG/DL
C4 SERPL-MCNC: 26 MG/DL
CALCIUM SERPL-MCNC: 9.7 MG/DL
CHLORIDE SERPL-SCNC: 102 MMOL/L
CO2 SERPL-SCNC: 27 MMOL/L
CREAT SERPL-MCNC: 0.66 MG/DL
CRP SERPL-MCNC: <3 MG/L
DEPRECATED KAPPA LC FREE/LAMBDA SER: 1.02 RATIO
EGFRCR SERPLBLD CKD-EPI 2021: 128 ML/MIN/1.73M2
ERYTHROCYTE [SEDIMENTATION RATE] IN BLOOD BY WESTERGREN METHOD: 3 MM/HR
GLUCOSE SERPL-MCNC: 94 MG/DL
HCT VFR BLD CALC: 41.5 %
HGB BLD-MCNC: 14 G/DL
IGA SER QL IEP: 179 MG/DL
IGG SER QL IEP: 1033 MG/DL
IGG SER QL IEP: 1120 MG/DL
IGG1 SER-MCNC: 580 MG/DL
IGG2 SER-MCNC: 349 MG/DL
IGG3 SER-MCNC: 69 MG/DL
IGG4 SER-MCNC: 47.2 MG/DL
IGM SER QL IEP: 165 MG/DL
KAPPA LC CSF-MCNC: 1.21 MG/DL
KAPPA LC SERPL-MCNC: 1.23 MG/DL
MCHC RBC-ENTMCNC: 31.9 PG
MCHC RBC-ENTMCNC: 33.7 G/DL
MCV RBC AUTO: 94.5 FL
MYELOPEROXIDASE AB SER QL IA: <0.2 AL
MYELOPEROXIDASE CELLS FLD QL: NEGATIVE
PLATELET # BLD AUTO: 279 K/UL
POTASSIUM SERPL-SCNC: 4.1 MMOL/L
PROT SERPL-MCNC: 7.5 G/DL
PROTEINASE3 AB SER IA-ACNC: <0.2 AL
PROTEINASE3 AB SER-ACNC: NEGATIVE
RBC # BLD: 4.39 M/UL
RBC # FLD: 12.3 %
SODIUM SERPL-SCNC: 141 MMOL/L
WBC # FLD AUTO: 5.5 K/UL

## 2025-03-27 LAB — IGE SER-MCNC: 436 KU/L

## 2025-03-29 LAB
ALBUMIN MFR SERPL ELPH: 63.2 %
ALBUMIN SERPL-MCNC: 4.6 G/DL
ALBUMIN/GLOB SERPL: 1.8 RATIO
ALPHA1 GLOB MFR SERPL ELPH: 3.5 %
ALPHA1 GLOB SERPL ELPH-MCNC: 0.3 G/DL
ALPHA2 GLOB MFR SERPL ELPH: 8.5 %
ALPHA2 GLOB SERPL ELPH-MCNC: 0.6 G/DL
B-GLOBULIN MFR SERPL ELPH: 10.2 %
B-GLOBULIN SERPL ELPH-MCNC: 0.7 G/DL
GAMMA GLOB FLD ELPH-MCNC: 1.1 G/DL
GAMMA GLOB MFR SERPL ELPH: 14.6 %
INTERPRETATION SERPL IEP-IMP: NORMAL
M PROTEIN SPEC IFE-MCNC: NORMAL
PROT SERPL-MCNC: 7.2 G/DL
PROT SERPL-MCNC: 7.2 G/DL

## 2025-04-01 ENCOUNTER — RX RENEWAL (OUTPATIENT)
Age: 20
End: 2025-04-01

## 2025-04-02 ENCOUNTER — APPOINTMENT (OUTPATIENT)
Dept: PULMONOLOGY | Facility: CLINIC | Age: 20
End: 2025-04-02

## 2025-04-03 ENCOUNTER — RX RENEWAL (OUTPATIENT)
Age: 20
End: 2025-04-03

## 2025-04-07 ENCOUNTER — APPOINTMENT (OUTPATIENT)
Age: 20
End: 2025-04-07
Payer: COMMERCIAL

## 2025-04-07 VITALS
TEMPERATURE: 97.4 F | HEIGHT: 65 IN | WEIGHT: 196 LBS | OXYGEN SATURATION: 99 % | SYSTOLIC BLOOD PRESSURE: 106 MMHG | DIASTOLIC BLOOD PRESSURE: 67 MMHG | HEART RATE: 75 BPM | BODY MASS INDEX: 32.65 KG/M2

## 2025-04-07 PROCEDURE — G2211 COMPLEX E/M VISIT ADD ON: CPT | Mod: NC

## 2025-04-07 PROCEDURE — 99214 OFFICE O/P EST MOD 30 MIN: CPT

## 2025-04-16 ENCOUNTER — APPOINTMENT (OUTPATIENT)
Dept: MRI IMAGING | Facility: HOSPITAL | Age: 20
End: 2025-04-16

## 2025-04-29 ENCOUNTER — APPOINTMENT (OUTPATIENT)
Dept: RHEUMATOLOGY | Facility: CLINIC | Age: 20
End: 2025-04-29
Payer: COMMERCIAL

## 2025-04-29 ENCOUNTER — APPOINTMENT (OUTPATIENT)
Dept: OTOLARYNGOLOGY | Facility: CLINIC | Age: 20
End: 2025-04-29

## 2025-04-29 VITALS
OXYGEN SATURATION: 98 % | DIASTOLIC BLOOD PRESSURE: 64 MMHG | TEMPERATURE: 97.8 F | WEIGHT: 198 LBS | HEART RATE: 72 BPM | BODY MASS INDEX: 32.99 KG/M2 | SYSTOLIC BLOOD PRESSURE: 96 MMHG | HEIGHT: 65 IN

## 2025-04-29 DIAGNOSIS — J30.9 ALLERGIC RHINITIS, UNSPECIFIED: ICD-10-CM

## 2025-04-29 PROCEDURE — G2211 COMPLEX E/M VISIT ADD ON: CPT | Mod: NC

## 2025-04-29 PROCEDURE — 99215 OFFICE O/P EST HI 40 MIN: CPT

## 2025-04-29 RX ORDER — LORATADINE 10 MG/1
10 CAPSULE, LIQUID FILLED ORAL DAILY
Qty: 90 | Refills: 3 | Status: ACTIVE | COMMUNITY
Start: 2025-04-29 | End: 1900-01-01

## 2025-04-29 RX ORDER — FOLIC ACID 1 MG/1
1 TABLET ORAL DAILY
Qty: 90 | Refills: 1 | Status: ACTIVE | COMMUNITY
Start: 2025-04-29 | End: 1900-01-01

## 2025-05-13 ENCOUNTER — RX RENEWAL (OUTPATIENT)
Age: 20
End: 2025-05-13

## 2025-07-29 ENCOUNTER — RX RENEWAL (OUTPATIENT)
Age: 20
End: 2025-07-29

## 2025-08-12 ENCOUNTER — APPOINTMENT (OUTPATIENT)
Dept: RHEUMATOLOGY | Facility: CLINIC | Age: 20
End: 2025-08-12

## 2025-08-16 ENCOUNTER — RX RENEWAL (OUTPATIENT)
Age: 20
End: 2025-08-16

## 2025-08-18 ENCOUNTER — APPOINTMENT (OUTPATIENT)
Dept: PEDIATRIC ALLERGY IMMUNOLOGY | Facility: CLINIC | Age: 20
End: 2025-08-18

## 2025-08-22 ENCOUNTER — LABORATORY RESULT (OUTPATIENT)
Age: 20
End: 2025-08-22

## 2025-08-22 ENCOUNTER — APPOINTMENT (OUTPATIENT)
Dept: RHEUMATOLOGY | Facility: CLINIC | Age: 20
End: 2025-08-22

## 2025-08-22 VITALS
HEART RATE: 66 BPM | HEIGHT: 66.14 IN | SYSTOLIC BLOOD PRESSURE: 105 MMHG | TEMPERATURE: 98 F | OXYGEN SATURATION: 99 % | DIASTOLIC BLOOD PRESSURE: 62 MMHG | BODY MASS INDEX: 35.36 KG/M2 | WEIGHT: 220 LBS

## 2025-08-22 DIAGNOSIS — I77.82 ANTINEUTROPHILIC CYTOPLASMIC ANTIBODY [ANCA] VASCULITIS: ICD-10-CM

## 2025-08-22 DIAGNOSIS — J30.9 ALLERGIC RHINITIS, UNSPECIFIED: ICD-10-CM

## 2025-08-22 PROCEDURE — 99215 OFFICE O/P EST HI 40 MIN: CPT | Mod: 25

## 2025-08-25 LAB
CRP SERPL-MCNC: <3 MG/L
ERYTHROCYTE [SEDIMENTATION RATE] IN BLOOD BY WESTERGREN METHOD: 3 MM/HR
IGE SER-MCNC: 512 KU/L